# Patient Record
Sex: MALE | Race: WHITE | NOT HISPANIC OR LATINO | Employment: FULL TIME | URBAN - METROPOLITAN AREA
[De-identification: names, ages, dates, MRNs, and addresses within clinical notes are randomized per-mention and may not be internally consistent; named-entity substitution may affect disease eponyms.]

---

## 2017-02-04 ENCOUNTER — COMMUNICATION - HEALTHEAST (OUTPATIENT)
Dept: FAMILY MEDICINE | Facility: CLINIC | Age: 42
End: 2017-02-04

## 2017-02-04 DIAGNOSIS — K21.9 GERD WITHOUT ESOPHAGITIS: ICD-10-CM

## 2017-05-06 ENCOUNTER — COMMUNICATION - HEALTHEAST (OUTPATIENT)
Dept: FAMILY MEDICINE | Facility: CLINIC | Age: 42
End: 2017-05-06

## 2017-05-06 DIAGNOSIS — K21.9 GERD WITHOUT ESOPHAGITIS: ICD-10-CM

## 2017-12-09 ENCOUNTER — COMMUNICATION - HEALTHEAST (OUTPATIENT)
Dept: FAMILY MEDICINE | Facility: CLINIC | Age: 42
End: 2017-12-09

## 2017-12-09 DIAGNOSIS — K21.9 GERD WITHOUT ESOPHAGITIS: ICD-10-CM

## 2017-12-22 ENCOUNTER — OFFICE VISIT - HEALTHEAST (OUTPATIENT)
Dept: FAMILY MEDICINE | Facility: CLINIC | Age: 42
End: 2017-12-22

## 2017-12-22 DIAGNOSIS — M54.2 NECK PAIN: ICD-10-CM

## 2017-12-22 DIAGNOSIS — K21.9 GASTROESOPHAGEAL REFLUX DISEASE WITHOUT ESOPHAGITIS: ICD-10-CM

## 2017-12-22 DIAGNOSIS — F17.200 TOBACCO USE DISORDER: ICD-10-CM

## 2017-12-22 ASSESSMENT — MIFFLIN-ST. JEOR: SCORE: 1833.8

## 2017-12-28 LAB
CHOLEST SERPL-MCNC: 330 MG/DL
FASTING STATUS PATIENT QL REPORTED: YES
HDLC SERPL-MCNC: 39 MG/DL
LDLC SERPL CALC-MCNC: 224 MG/DL
TRIGL SERPL-MCNC: 336 MG/DL

## 2018-02-03 ENCOUNTER — COMMUNICATION - HEALTHEAST (OUTPATIENT)
Dept: FAMILY MEDICINE | Facility: CLINIC | Age: 43
End: 2018-02-03

## 2018-03-08 ENCOUNTER — COMMUNICATION - HEALTHEAST (OUTPATIENT)
Dept: FAMILY MEDICINE | Facility: CLINIC | Age: 43
End: 2018-03-08

## 2018-03-08 DIAGNOSIS — K21.9 GERD WITHOUT ESOPHAGITIS: ICD-10-CM

## 2019-03-10 ENCOUNTER — COMMUNICATION - HEALTHEAST (OUTPATIENT)
Dept: FAMILY MEDICINE | Facility: CLINIC | Age: 44
End: 2019-03-10

## 2019-03-10 DIAGNOSIS — K21.9 GERD WITHOUT ESOPHAGITIS: ICD-10-CM

## 2019-04-11 ENCOUNTER — OFFICE VISIT - HEALTHEAST (OUTPATIENT)
Dept: FAMILY MEDICINE | Facility: CLINIC | Age: 44
End: 2019-04-11

## 2019-04-11 DIAGNOSIS — K21.9 GERD WITHOUT ESOPHAGITIS: ICD-10-CM

## 2019-04-11 DIAGNOSIS — E78.5 HYPERLIPIDEMIA, UNSPECIFIED HYPERLIPIDEMIA TYPE: ICD-10-CM

## 2019-04-11 DIAGNOSIS — R06.2 WHEEZING: ICD-10-CM

## 2019-04-11 DIAGNOSIS — F17.200 TOBACCO USE DISORDER: ICD-10-CM

## 2019-04-11 LAB
ANION GAP SERPL CALCULATED.3IONS-SCNC: 11 MMOL/L (ref 5–18)
BUN SERPL-MCNC: 19 MG/DL (ref 8–22)
CALCIUM SERPL-MCNC: 9.8 MG/DL (ref 8.5–10.5)
CHLORIDE BLD-SCNC: 106 MMOL/L (ref 98–107)
CHOLEST SERPL-MCNC: 251 MG/DL
CO2 SERPL-SCNC: 23 MMOL/L (ref 22–31)
CREAT SERPL-MCNC: 0.86 MG/DL (ref 0.7–1.3)
FASTING STATUS PATIENT QL REPORTED: YES
GFR SERPL CREATININE-BSD FRML MDRD: >60 ML/MIN/1.73M2
GLUCOSE BLD-MCNC: 73 MG/DL (ref 70–125)
HDLC SERPL-MCNC: 37 MG/DL
LDLC SERPL CALC-MCNC: 198 MG/DL
POTASSIUM BLD-SCNC: 3.9 MMOL/L (ref 3.5–5)
SODIUM SERPL-SCNC: 140 MMOL/L (ref 136–145)
TRIGL SERPL-MCNC: 80 MG/DL

## 2019-04-11 ASSESSMENT — MIFFLIN-ST. JEOR: SCORE: 1806.59

## 2019-04-12 ENCOUNTER — COMMUNICATION - HEALTHEAST (OUTPATIENT)
Dept: FAMILY MEDICINE | Facility: CLINIC | Age: 44
End: 2019-04-12

## 2019-04-29 ENCOUNTER — COMMUNICATION - HEALTHEAST (OUTPATIENT)
Dept: FAMILY MEDICINE | Facility: CLINIC | Age: 44
End: 2019-04-29

## 2019-04-29 DIAGNOSIS — E78.5 HYPERLIPIDEMIA, UNSPECIFIED HYPERLIPIDEMIA TYPE: ICD-10-CM

## 2019-11-25 ENCOUNTER — COMMUNICATION - HEALTHEAST (OUTPATIENT)
Dept: FAMILY MEDICINE | Facility: CLINIC | Age: 44
End: 2019-11-25

## 2019-11-25 DIAGNOSIS — E78.5 HYPERLIPIDEMIA, UNSPECIFIED HYPERLIPIDEMIA TYPE: ICD-10-CM

## 2020-03-21 ENCOUNTER — COMMUNICATION - HEALTHEAST (OUTPATIENT)
Dept: FAMILY MEDICINE | Facility: CLINIC | Age: 45
End: 2020-03-21

## 2020-03-21 DIAGNOSIS — K21.9 GERD WITHOUT ESOPHAGITIS: ICD-10-CM

## 2020-05-18 ENCOUNTER — COMMUNICATION - HEALTHEAST (OUTPATIENT)
Dept: FAMILY MEDICINE | Facility: CLINIC | Age: 45
End: 2020-05-18

## 2020-05-18 DIAGNOSIS — E78.5 HYPERLIPIDEMIA, UNSPECIFIED HYPERLIPIDEMIA TYPE: ICD-10-CM

## 2020-06-20 ENCOUNTER — COMMUNICATION - HEALTHEAST (OUTPATIENT)
Dept: FAMILY MEDICINE | Facility: CLINIC | Age: 45
End: 2020-06-20

## 2020-06-20 DIAGNOSIS — K21.9 GERD WITHOUT ESOPHAGITIS: ICD-10-CM

## 2020-08-15 ENCOUNTER — COMMUNICATION - HEALTHEAST (OUTPATIENT)
Dept: FAMILY MEDICINE | Facility: CLINIC | Age: 45
End: 2020-08-15

## 2020-08-15 DIAGNOSIS — E78.5 HYPERLIPIDEMIA, UNSPECIFIED HYPERLIPIDEMIA TYPE: ICD-10-CM

## 2020-09-17 ENCOUNTER — COMMUNICATION - HEALTHEAST (OUTPATIENT)
Dept: FAMILY MEDICINE | Facility: CLINIC | Age: 45
End: 2020-09-17

## 2020-09-17 DIAGNOSIS — K21.9 GERD WITHOUT ESOPHAGITIS: ICD-10-CM

## 2020-11-03 ENCOUNTER — COMMUNICATION - HEALTHEAST (OUTPATIENT)
Dept: FAMILY MEDICINE | Facility: CLINIC | Age: 45
End: 2020-11-03

## 2020-11-06 ENCOUNTER — OFFICE VISIT - HEALTHEAST (OUTPATIENT)
Dept: FAMILY MEDICINE | Facility: CLINIC | Age: 45
End: 2020-11-06

## 2020-11-06 DIAGNOSIS — Z23 NEED FOR TETANUS BOOSTER: ICD-10-CM

## 2020-11-06 DIAGNOSIS — E78.5 HYPERLIPIDEMIA, UNSPECIFIED HYPERLIPIDEMIA TYPE: ICD-10-CM

## 2020-11-06 LAB
ALBUMIN SERPL-MCNC: 4 G/DL (ref 3.5–5)
ALP SERPL-CCNC: 97 U/L (ref 45–120)
ALT SERPL W P-5'-P-CCNC: 24 U/L (ref 0–45)
AST SERPL W P-5'-P-CCNC: 21 U/L (ref 0–40)
BILIRUB DIRECT SERPL-MCNC: 0.1 MG/DL
BILIRUB SERPL-MCNC: 0.4 MG/DL (ref 0–1)
CHOLEST SERPL-MCNC: 180 MG/DL
FASTING STATUS PATIENT QL REPORTED: YES
HDLC SERPL-MCNC: 32 MG/DL
LDLC SERPL CALC-MCNC: 122 MG/DL
PROT SERPL-MCNC: 6.8 G/DL (ref 6–8)
TRIGL SERPL-MCNC: 132 MG/DL

## 2020-11-06 ASSESSMENT — MIFFLIN-ST. JEOR: SCORE: 1943.8

## 2020-11-07 ENCOUNTER — COMMUNICATION - HEALTHEAST (OUTPATIENT)
Dept: FAMILY MEDICINE | Facility: CLINIC | Age: 45
End: 2020-11-07

## 2020-11-07 DIAGNOSIS — K21.9 GERD WITHOUT ESOPHAGITIS: ICD-10-CM

## 2020-11-18 ENCOUNTER — OFFICE VISIT - HEALTHEAST (OUTPATIENT)
Dept: FAMILY MEDICINE | Facility: CLINIC | Age: 45
End: 2020-11-18

## 2020-11-18 DIAGNOSIS — Z02.89 HEALTH EXAMINATION OF DEFINED SUBPOPULATION: ICD-10-CM

## 2020-11-18 DIAGNOSIS — Z87.891 HISTORY OF TOBACCO USE: ICD-10-CM

## 2020-11-18 LAB
ALBUMIN UR-MCNC: NEGATIVE MG/DL
APPEARANCE UR: CLEAR
BILIRUB UR QL STRIP: NEGATIVE
COLOR UR AUTO: YELLOW
GLUCOSE UR STRIP-MCNC: NEGATIVE MG/DL
HGB UR QL STRIP: NEGATIVE
KETONES UR STRIP-MCNC: NEGATIVE MG/DL
LEUKOCYTE ESTERASE UR QL STRIP: NEGATIVE
NITRATE UR QL: NEGATIVE
PH UR STRIP: 7 [PH] (ref 5–8)
SP GR UR STRIP: 1.02 (ref 1–1.03)
UROBILINOGEN UR STRIP-ACNC: NORMAL

## 2020-11-18 ASSESSMENT — MIFFLIN-ST. JEOR: SCORE: 1954.12

## 2020-11-21 ENCOUNTER — COMMUNICATION - HEALTHEAST (OUTPATIENT)
Dept: FAMILY MEDICINE | Facility: CLINIC | Age: 45
End: 2020-11-21

## 2020-11-21 DIAGNOSIS — E78.5 HYPERLIPIDEMIA, UNSPECIFIED HYPERLIPIDEMIA TYPE: ICD-10-CM

## 2020-12-11 ENCOUNTER — COMMUNICATION - HEALTHEAST (OUTPATIENT)
Dept: FAMILY MEDICINE | Facility: CLINIC | Age: 45
End: 2020-12-11

## 2021-03-01 ENCOUNTER — COMMUNICATION - HEALTHEAST (OUTPATIENT)
Dept: FAMILY MEDICINE | Facility: CLINIC | Age: 46
End: 2021-03-01

## 2021-03-01 DIAGNOSIS — K21.9 GERD WITHOUT ESOPHAGITIS: ICD-10-CM

## 2021-05-07 ENCOUNTER — HOSPITAL ENCOUNTER (EMERGENCY)
Facility: CLINIC | Age: 46
Discharge: HOME | End: 2021-05-07
Attending: PHYSICIAN ASSISTANT | Admitting: PHYSICIAN ASSISTANT
Payer: COMMERCIAL

## 2021-05-07 ENCOUNTER — NURSE TRIAGE (OUTPATIENT)
Dept: NURSING | Facility: CLINIC | Age: 46
End: 2021-05-07

## 2021-05-07 VITALS
HEIGHT: 70 IN | RESPIRATION RATE: 18 BRPM | TEMPERATURE: 98 F | WEIGHT: 245 LBS | BODY MASS INDEX: 35.07 KG/M2 | SYSTOLIC BLOOD PRESSURE: 132 MMHG | DIASTOLIC BLOOD PRESSURE: 89 MMHG | HEART RATE: 53 BPM | OXYGEN SATURATION: 98 %

## 2021-05-07 DIAGNOSIS — S05.02XA ABRASION OF LEFT CORNEA, INITIAL ENCOUNTER: ICD-10-CM

## 2021-05-07 DIAGNOSIS — H57.12 ACUTE LEFT EYE PAIN: ICD-10-CM

## 2021-05-07 PROCEDURE — 99284 EMERGENCY DEPT VISIT MOD MDM: CPT | Performed by: PHYSICIAN ASSISTANT

## 2021-05-07 PROCEDURE — 99283 EMERGENCY DEPT VISIT LOW MDM: CPT | Performed by: PHYSICIAN ASSISTANT

## 2021-05-07 PROCEDURE — 250N000009 HC RX 250: Performed by: PHYSICIAN ASSISTANT

## 2021-05-07 RX ORDER — TETRACAINE HYDROCHLORIDE 5 MG/ML
1-2 SOLUTION OPHTHALMIC ONCE
Status: DISCONTINUED | OUTPATIENT
Start: 2021-05-07 | End: 2021-05-07 | Stop reason: HOSPADM

## 2021-05-07 ASSESSMENT — ENCOUNTER SYMPTOMS
EYE PAIN: 1
CARDIOVASCULAR NEGATIVE: 1
CHILLS: 0
HEADACHES: 0
APPETITE CHANGE: 0
ACTIVITY CHANGE: 0
PSYCHIATRIC NEGATIVE: 1
PHOTOPHOBIA: 0
GASTROINTESTINAL NEGATIVE: 1
RESPIRATORY NEGATIVE: 1
EYE ITCHING: 0
EYE DISCHARGE: 1
FEVER: 0
NEUROLOGICAL NEGATIVE: 1
EYE REDNESS: 1

## 2021-05-07 ASSESSMENT — VISUAL ACUITY: OD: 20/40;WITH CORRECTIVE LENSES

## 2021-05-07 ASSESSMENT — MIFFLIN-ST. JEOR: SCORE: 1992.56

## 2021-05-07 NOTE — TELEPHONE ENCOUNTER
Triage call:   Not with patient- no consent on file  Scratched his eye- been up since 2 am and it was very painful    Unable to ask additional questions to patient - girlfriend states that she is a nurse and just is wondering if the ER would be able to help with this injury - writer advised that the ER would be able to help with an eye injury.   Unable to provide additional triage as she is not with the patient. Declines additional questions.     Gabriela Bates RN BSN 5/7/2021 7:16 AM    Reason for Disposition    [1] Caller is not with the adult (patient) AND [2] probable NON-URGENT symptoms    Additional Information    Negative: [1] Caller is not with the adult (patient) AND [2] reporting urgent symptoms    Negative: Lab result questions    Negative: Medication questions    Negative: Caller can't be reached by phone    Negative: Caller has already spoken to PCP or another triager    Negative: RN needs further essential information from caller in order to complete triage    Negative: Requesting regular office appointment    Negative: [1] Caller requesting NON-URGENT health information AND [2] PCP's office is the best resource    Negative: Health Information question, no triage required and triager able to answer question    Negative: General information question, no triage required and triager able to answer question    Negative: Question about upcoming scheduled test, no triage required and triager able to answer question    Protocols used: INFORMATION ONLY CALL - NO TRIAGE[facility]

## 2021-05-07 NOTE — LETTER
May 7, 2021      To Whom It May Concern:      Christian Judd was seen in our Emergency Department today, 05/07/21.  Please excuse patient from work today.  Patient can return to work on Monday as long as symptoms improved and resolved.      Sincerely,        Courtney Thompson PA-C

## 2021-05-07 NOTE — DISCHARGE INSTRUCTIONS
Use eye ointment as directed to left eye.   Tylenol/ibuprofen over the counter as needed for pain.     Follow up with [facility] for recheck in next 2-3 days.     Increase fluids, rest, warm compress to eye, return to the Emergency Room if increased pain, visual changes, eyelid swelling or redness, fevers, or change/worsening symptoms occur.

## 2021-05-07 NOTE — ED PROVIDER NOTES
History     Chief Complaint   Patient presents with     Eye Pain     wike 2 am withleft eye pain. no injury, does not wear contacts, tearing, eye lid swollen     HPI  Christian Judd is a 45 year old male who presents today with left eye pain that woke him up at 2am. Patient denies any known injury or foreign body in the left eye, but states while cooking yesterday he had hot pepper oil get in the left eye with no pain initially after it and no pain in the evening until he woke up at 2 AM with left eye pain, redness and tearing.  Patient denies any contact lens use, recent illness, fevers, eyelid swelling or redness, deep eye pain, loss of vision or double vision.     Allergies:  Allergies   Allergen Reactions     Nkda [No Known Drug Allergies]        Problem List:    Patient Active Problem List    Diagnosis Date Noted     CARDIOVASCULAR SCREENING; LDL GOAL LESS THAN 160 10/31/2010     Priority: Medium        Past Medical History:    No past medical history on file.    Past Surgical History:    No past surgical history on file.    Family History:    No family history on file.    Social History:  Marital Status:  Single [1]  Social History     Tobacco Use     Smoking status: Current Every Day Smoker   Substance Use Topics     Alcohol use: No     Drug use: No        Medications:    gentamicin (GARAMYCIN) 0.3 % ophthalmic ointment  NO ACTIVE MEDICATIONS          Review of Systems   Constitutional: Negative for activity change, appetite change, chills and fever.   HENT: Negative.    Eyes: Positive for pain, discharge, redness and visual disturbance (blurry vision due to ). Negative for photophobia and itching.   Respiratory: Negative.    Cardiovascular: Negative.    Gastrointestinal: Negative.    Skin: Negative.    Neurological: Negative.  Negative for headaches.   Psychiatric/Behavioral: Negative.    All other systems reviewed and are negative.      Physical Exam   BP: 132/89  Pulse: 53  Temp: 98  F (36.7  C)  Resp:  "18  Height: 177.8 cm (5' 10\")  Weight: 111.1 kg (245 lb)  SpO2: 98 %      Physical Exam  Vitals signs and nursing note reviewed.   Constitutional:       General: He is in acute distress.      Appearance: Normal appearance. He is normal weight. He is not ill-appearing, toxic-appearing or diaphoretic.   HENT:      Head: Normocephalic and atraumatic.   Eyes:      General: Lids are normal. Lids are everted, no foreign bodies appreciated. Gaze aligned appropriately. No scleral icterus.        Right eye: No foreign body, discharge or hordeolum.         Left eye: Discharge present.No foreign body or hordeolum.      Extraocular Movements: Extraocular movements intact.      Right eye: Normal extraocular motion and no nystagmus.      Left eye: Normal extraocular motion and no nystagmus.      Conjunctiva/sclera:      Right eye: Right conjunctiva is not injected. No chemosis, exudate or hemorrhage.     Left eye: Left conjunctiva is injected. No chemosis, exudate or hemorrhage.     Pupils: Pupils are equal, round, and reactive to light.      Right eye: Pupil is round, reactive and not sluggish.      Left eye: Pupil is round, reactive and not sluggish. Corneal abrasion and fluorescein uptake present. Ollie exam negative.     Slit lamp exam:     Left eye: Photophobia present. No corneal flare, corneal ulcer, foreign body, hyphema, hypopyon, anterior chamber bulge or anterior chamber flares.        Comments: Patient unable to do visual acuity due to watery left eye.   Right eye 20/40  Left eye 20/150    Occular pressure  Right eye 13  Left eye 15   Neurological:      Mental Status: He is alert.         ED Course        Procedures              Critical Care time:  none                  No results found for this or any previous visit (from the past 24 hour(s)).    Medications - No data to display    Assessments & Plan (with Medical Decision Making)     I have reviewed the nursing notes.    I have reviewed the findings, diagnosis, " plan and need for follow up with the patient.  Christian Judd is a 45 year old male who presents today with left eye pain that woke him up at 2am. Patient denies any known injury or foreign body in the left eye, but states while cooking yesterday he had hot pepper oil get in the left eye with no pain initially after it and no pain in the evening until he woke up at 2 AM with left eye pain, redness and tearing.  Patient denies any contact lens use, recent illness, fevers, eyelid swelling or redness, deep eye pain, loss of vision or double vision.     See exam findings above.  Exam findings consistent with corneal abrasion possibly related to the hot pepper oil or possibly due to rubbing his eye middle the night.  No concerns at this time for dendritic lesions or corneal ulceration.  Patient sent home with gentamicin eye ointment and informed to [facility] with [facility] in the next couple of days for further evaluation and recheck.  Patient to return sooner if symptoms worsen or change these were discussed and given on discharge paperwork.  Patient discharged in stable condition with no concerns for dendritic lesions, pre or periorbital cellulitis.    Discharge Medication List as of 5/7/2021  9:22 AM      START taking these medications    Details   gentamicin (GARAMYCIN) 0.3 % ophthalmic ointment Place 0.5 inches Into the left eye 3 times daily for 7 daysDisp-3.5 g, [id number]             Final diagnoses:   Acute left eye pain   Abrasion of left cornea, initial encounter       5/7/2021   [facility] EMERGENCY DEPT     Courtney Thompson PA-C  05/07/21 [id number]

## 2021-05-27 NOTE — TELEPHONE ENCOUNTER
Patient Returning Call  Reason for call:  Return clinic call  Information relayed to patient:   Message from Rg Monique MD sent at 4/12/2019  6:28 AM CDT -----  Please call with the message below and let me know if he will start a cholesterol lowering medication or will work on diet and exercise and recheck. Results verified to mail as well.     Christian,     Here are your test results.  Your kidney function tests are normal and your blood sugar test is also normal.     Your cholesterol numbers remain significantly elevated.  This is likely a combination of genetics as well as your diet.  As we discussed, you can make improvements in your diet and exercise.  However, your numbers are significantly elevated and in calculating your cardiovascular risk you do meet the criteria for a cholesterol-lowering medication.  I recommend that you consider a medication such as atorvastatin/Lipitor.  Please let me know if you would like to consider this my concerns to your pharmacy.  I do recommend rechecking your cholesterol numbers in 3 months.  Patient has additional questions:  Yes  If YES, what are your questions/concerns:  n/a  Okay to leave a detailed message?: No call back needed           Patient will think about starting a statin drug and let provider know.

## 2021-05-27 NOTE — TELEPHONE ENCOUNTER
----- Message from Rg Monique MD sent at 4/12/2019  6:28 AM CDT -----  Please call with the message below and let me know if he will start a cholesterol lowering medication or will work on diet and exercise and recheck. Results verified to mail as well.    Christian,    Here are your test results.  Your kidney function tests are normal and your blood sugar test is also normal.    Your cholesterol numbers remain significantly elevated.  This is likely a combination of genetics as well as your diet.  As we discussed, you can make improvements in your diet and exercise.  However, your numbers are significantly elevated and in calculating your cardiovascular risk you do meet the criteria for a cholesterol-lowering medication.  I recommend that you consider a medication such as atorvastatin/Lipitor.  Please let me know if you would like to consider this my concerns to your pharmacy.  I do recommend rechecking your cholesterol numbers in 3 months.    Please call with questions or contact us using [employer].    Sincerely,        Electronically signed by Rg Monique MD

## 2021-05-27 NOTE — PATIENT INSTRUCTIONS - HE
We will check your laboratory testing as discussed      Your cholesterol numbers are high.  I recommend that you work to improve your diet:    Consume a diet that encourages vegetables, fruits, and whole grains  Dairy products should be low fat  Eat more poultry, fish, beans and nuts as a primary protein source  Limit sweets, sugar-sweetened beverages, and red meats  Use healthy oils like olive oil or canola oil for cooking  Limit high fat foods  Drink more water  Limit salt in your diet  Get plenty of aerobic physical activity    I recommend rechecking in three months.    Please let me know if you have questions or need additional resources.

## 2021-05-27 NOTE — PROGRESS NOTES
Assessment/ Plan     1. GERD without esophagitis    Symptoms are currently stable he is required a higher dose of omeprazole discussed possible weaning  Patient preference is to stay on the current dose  Discussed that he has breakthrough symptoms would favor an upper endoscopy  Reviewed potential side effects  We will check a basic metabolic panel  - omeprazole (PRILOSEC) 40 MG capsule; TAKE 1 CAPSULE BY MOUTH EVERYDAY  Dispense: 90 capsule; Refill: 3    2. Wheezing    Refilled his albuterol inhaler  Discussed that he could be considered for pulmonary function testing  Strongly recommend tobacco cessation  - albuterol (PROAIR HFA;PROVENTIL HFA;VENTOLIN HFA) 90 mcg/actuation inhaler; Inhale 2 puffs every 4 (four) hours as needed for wheezing. 1-2 puffs every 4-6 hours as needed.  Dispense: 1 Inhaler; Refill: 5    3. Nicotine Dependence    Counseled the patient regarding tobacco cessation options  Patient preference is not to wean tobacco at this time    4. Hyperlipidemia, unspecified hyperlipidemia type    Given his elevated cholesterol readings there is likely a history of familial hypercholesterolemia  We will check his cholesterol calculate the 10-year cardiovascular risk  Reviewed dietary and lifestyle modifications  Consider further evaluation including a CT coronary calcium test in the future  - Lipid Cascade  - Basic Metabolic Panel     Patient verbalized understanding and agrees to the above plan          Subjective:       Christian Judd is a 43 y.o. male who presents to the clinic for medication check.  He comes to clinic infrequently he was last seen in 2017.  His medical history is notable for gastroesophageal reflux disease, nicotine dependence, and hyperlipidemia.  He continues to take omeprazole 40 mg daily.  He notes that when he takes the medication his symptoms are well controlled.  He has not been able to wean off of the medication.  He has known history of Valles's esophagus.  He is aware of  "dietary lifestyle modifications that can be helpful.    Additionally, he has a history of ongoing nicotine dependence.  He started smoking at the age of 14 and smokes approximately 1 pack/day.  He has no interest in quitting tobacco at this time.    Finally, he has a history of hyperlipidemia.  His total cholesterol was 330 with an LDL of 224.  His triglycerides were 336 with and HDL was 39.  He follows up today for a cholesterol check.  He admits that his diet could be better.      Review of systems is notable for occasional wheezing which she attributes to tobacco cessation.  Has no known history of asthma or chronic obstructive pulmonary disease.    Overall, he generally feels well.  He denies headache, dizziness, chest pain, palpitations, or other concerns.      The following portions of the patient's history were reviewed and updated as appropriate: allergies, current medications, past family history, past medical history, past social history, past surgical history and problem list. Medications have been reconciled    Review of Systems   A 12 point comprehensive review of systems was negative except as noted.      Current Outpatient Medications   Medication Sig Dispense Refill     albuterol (PROAIR HFA;PROVENTIL HFA;VENTOLIN HFA) 90 mcg/actuation inhaler Inhale 2 puffs every 4 (four) hours as needed for wheezing. 1-2 puffs every 4-6 hours as needed. 1 Inhaler 5     omeprazole (PRILOSEC) 40 MG capsule TAKE 1 CAPSULE BY MOUTH EVERYDAY 90 capsule 3     cyclobenzaprine (FLEXERIL) 10 MG tablet Take 1 tablet (10 mg total) by mouth 3 (three) times a day as needed for muscle spasms. 30 tablet 0     No current facility-administered medications for this visit.        Objective:      /72   Pulse 76   Temp 98  F (36.7  C)   Resp 16   Ht 5' 10\" (1.778 m)   Wt 204 lb (92.5 kg)   BMI 29.27 kg/m        General appearance: alert, appears stated age and cooperative  Head: Normocephalic, without obvious abnormality, " atraumatic  Eyes: conjunctivae/corneas clear. PERRL, EOM's intact.   Nose: Nares normal. Septum midline. Mucosa normal  Neck: no adenopathy, supple, symmetrical, trachea midline a  Lungs: clear to auscultation bilaterally  Heart: regular rate and rhythm, S1, S2 normal, no murmur, click, rub or gallop  Extremities: extremities normal, atraumatic, no cyanosis or edema  Skin: Skin color, texture, turgor normal  Lymph nodes: Cervical nodes normal.  Neurologic: Alert and oriented X 3         Recent Results (from the past 168 hour(s))   Lipid Cascade   Result Value Ref Range    Cholesterol 251 (H) <=199 mg/dL    Triglycerides 80 <=149 mg/dL    HDL Cholesterol 37 (L) >=40 mg/dL    LDL Calculated 198 (H) <=129 mg/dL    Patient Fasting > 8hrs? Yes    Basic Metabolic Panel   Result Value Ref Range    Sodium 140 136 - 145 mmol/L    Potassium 3.9 3.5 - 5.0 mmol/L    Chloride 106 98 - 107 mmol/L    CO2 23 22 - 31 mmol/L    Anion Gap, Calculation 11 5 - 18 mmol/L    Glucose 73 70 - 125 mg/dL    Calcium 9.8 8.5 - 10.5 mg/dL    BUN 19 8 - 22 mg/dL    Creatinine 0.86 0.70 - 1.30 mg/dL    GFR MDRD Af Amer >60 >60 mL/min/1.73m2    GFR MDRD Non Af Amer >60 >60 mL/min/1.73m2          This note has been dictated using voice recognition software. Any grammatical or context distortions are unintentional and inherent to the software

## 2021-05-27 NOTE — TELEPHONE ENCOUNTER
Left message to call back for: lab results  Information to relay to patient:  MD message below and document call was returned.

## 2021-05-28 NOTE — TELEPHONE ENCOUNTER
I sent a prescription for atorvastatin to his pharmacy.  He can monitor for side effects including muscle aches and pains.  I do recommend that he recheck his cholesterol numbers in 3 months.  I entered laboratory orders so he can make a fasting lab appointment at that time and we will check liver tests at that time as well.

## 2021-05-28 NOTE — TELEPHONE ENCOUNTER
Left detailed message for pt relaying MD message below.  Please assist with scheduling lab only in 3 months (fasting) upon return call.

## 2021-05-28 NOTE — TELEPHONE ENCOUNTER
Medication Request  Medication name: cholesterol lower medication  Pharmacy Name and Location: [facility]  Reason for request: Provider recommend and patient would like to start a medication now  When did you use medication last?:  n/a  Patient offered appointment:  n/a  Okay to leave a detailed message: yes

## 2021-05-31 ENCOUNTER — RECORDS - HEALTHEAST (OUTPATIENT)
Dept: ADMINISTRATIVE | Facility: CLINIC | Age: 46
End: 2021-05-31

## 2021-05-31 VITALS — HEIGHT: 70 IN | WEIGHT: 210 LBS | BODY MASS INDEX: 30.06 KG/M2

## 2021-06-02 ENCOUNTER — RECORDS - HEALTHEAST (OUTPATIENT)
Dept: ADMINISTRATIVE | Facility: CLINIC | Age: 46
End: 2021-06-02

## 2021-06-02 VITALS — WEIGHT: 204 LBS | BODY MASS INDEX: 29.2 KG/M2 | HEIGHT: 70 IN

## 2021-06-03 NOTE — TELEPHONE ENCOUNTER
Refill Approved    Rx renewed per Medication Renewal Policy. Medication was last renewed on 5/1/19.    Gabriela Bates, [facility] Connection Triage/Med Refill 11/25/2019     Requested Prescriptions   Pending Prescriptions Disp Refills     atorvastatin (LIPITOR) 20 MG tablet [Pharmacy Med Name: ATORVASTATIN 20MG TABLETS] 90 tablet 0     Sig: TAKE 1 TABLET(20 MG) BY MOUTH DAILY       Statins Refill Protocol (Hmg CoA Reductase Inhibitors) Passed - 11/25/2019  3:26 AM        Passed - PCP or prescribing provider visit in past 12 months      Last office visit with prescriber/PCP: 4/11/2019 Rg Monique MD OR same dept: 4/11/2019 Rg Monique MD OR same specialty: 4/11/2019 Rg Monique MD  Last physical: Visit date not found Last MTM visit: Visit date not found   Next visit within 3 mo: Visit date not found  Next physical within 3 mo: Visit date not found  Prescriber OR PCP: Rg Monique MD  Last diagnosis associated with med order: 1. Hyperlipidemia, unspecified hyperlipidemia type  - atorvastatin (LIPITOR) 20 MG tablet [Pharmacy Med Name: ATORVASTATIN 20MG TABLETS]; TAKE 1 TABLET(20 MG) BY MOUTH DAILY  Dispense: 90 tablet; Refill: 0    If protocol passes may refill for 12 months if within 3 months of last provider visit (or a total of 15 months).

## 2021-06-04 VITALS
TEMPERATURE: 97.8 F | HEIGHT: 70 IN | DIASTOLIC BLOOD PRESSURE: 74 MMHG | WEIGHT: 237.4 LBS | HEART RATE: 64 BPM | BODY MASS INDEX: 33.99 KG/M2 | SYSTOLIC BLOOD PRESSURE: 116 MMHG

## 2021-06-04 VITALS
DIASTOLIC BLOOD PRESSURE: 71 MMHG | RESPIRATION RATE: 12 BRPM | HEART RATE: 78 BPM | TEMPERATURE: 97.6 F | BODY MASS INDEX: 33.79 KG/M2 | SYSTOLIC BLOOD PRESSURE: 116 MMHG | HEIGHT: 70 IN | OXYGEN SATURATION: 96 % | WEIGHT: 236 LBS

## 2021-06-07 NOTE — TELEPHONE ENCOUNTER
Refill Approved    Rx renewed per Medication Renewal Policy. Medication was last renewed on 4/11/19.    Aletha Kramer, [facility] Connection Triage/Med Refill 3/23/2020     Requested Prescriptions   Pending Prescriptions Disp Refills     omeprazole (PRILOSEC) 40 MG capsule [Pharmacy Med Name: OMEPRAZOLE 40MG CAPSULES] 90 capsule 3     Sig: TAKE 1 CAPSULE BY MOUTH EVERY DAY       GI Medications Refill Protocol Passed - 3/21/2020  3:25 AM        Passed - PCP or prescribing provider visit in last 12 or next 3 months.     Last office visit with prescriber/PCP: 4/11/2019 Rg Monique MD OR same dept: 4/11/2019 Rg Monique MD OR same specialty: 4/11/2019 Rg Monique MD  Last physical: Visit date not found Last MTM visit: Visit date not found   Next visit within 3 mo: Visit date not found  Next physical within 3 mo: Visit date not found  Prescriber OR PCP: Rg Monique MD  Last diagnosis associated with med order: 1. GERD without esophagitis  - omeprazole (PRILOSEC) 40 MG capsule [Pharmacy Med Name: OMEPRAZOLE 40MG CAPSULES]; TAKE 1 CAPSULE BY MOUTH EVERY DAY  Dispense: 90 capsule; Refill: 3    If protocol passes may refill for 12 months if within 3 months of last provider visit (or a total of 15 months).

## 2021-06-08 NOTE — TELEPHONE ENCOUNTER
RN cannot approve Refill Request    RN can NOT refill this medication PCP messaged that patient is overdue for Office Visit. Last office visit: 4/11/2019 Rg Monique MD Last Physical: Visit date not found Last MTM visit: Visit date not found Last visit same specialty: 4/11/2019 Rg Monique MD.  Next visit within 3 mo: Visit date not found  Next physical within 3 mo: Visit date not found      Karime Han, [facility] Connection Triage/Med Refill 5/20/2020    Requested Prescriptions   Pending Prescriptions Disp Refills     atorvastatin (LIPITOR) 20 MG tablet [Pharmacy Med Name: ATORVASTATIN 20MG TABLETS] 90 tablet 1     Sig: TAKE 1 TABLET(20 MG) BY MOUTH DAILY       Statins Refill Protocol (Hmg CoA Reductase Inhibitors) Failed - 5/18/2020  3:26 AM        Failed - PCP or prescribing provider visit in past 12 months      Last office visit with prescriber/PCP: 4/11/2019 Rg Monique MD OR same dept: Visit date not found OR same specialty: 4/11/2019 Rg Monique MD  Last physical: Visit date not found Last MTM visit: Visit date not found   Next visit within 3 mo: Visit date not found  Next physical within 3 mo: Visit date not found  Prescriber OR PCP: Rg Monique MD  Last diagnosis associated with med order: 1. Hyperlipidemia, unspecified hyperlipidemia type  - atorvastatin (LIPITOR) 20 MG tablet [Pharmacy Med Name: ATORVASTATIN 20MG TABLETS]; TAKE 1 TABLET(20 MG) BY MOUTH DAILY  Dispense: 90 tablet; Refill: 1    If protocol passes may refill for 12 months if within 3 months of last provider visit (or a total of 15 months).

## 2021-06-09 NOTE — TELEPHONE ENCOUNTER
RN cannot approve Refill Request    RN can NOT refill this medication PCP messaged that patient is overdue for Office Visit. Last office visit: 4/11/2019 Rg Monique MD Last Physical: Visit date not found Last MTM visit: Visit date not found Last visit same specialty: 4/11/2019 Rg Monique MD.  Next visit within 3 mo: Visit date not found  Next physical within 3 mo: Visit date not found      Veronika Basilio, [facility] Connection Triage/Med Refill 6/20/2020    Requested Prescriptions   Pending Prescriptions Disp Refills     omeprazole (PRILOSEC) 40 MG capsule [Pharmacy Med Name: OMEPRAZOLE 40MG CAPSULES] 90 capsule 0     Sig: TAKE 1 CAPSULE BY MOUTH EVERY DAY       GI Medications Refill Protocol Failed - 6/20/2020  3:26 AM        Failed - PCP or prescribing provider visit in last 12 or next 3 months.     Last office visit with prescriber/PCP: 4/11/2019 Rg Monique MD OR same dept: Visit date not found OR same specialty: 4/11/2019 Rg Monique MD  Last physical: Visit date not found Last MTM visit: Visit date not found   Next visit within 3 mo: Visit date not found  Next physical within 3 mo: Visit date not found  Prescriber OR PCP: Rg Monique MD  Last diagnosis associated with med order: 1. GERD without esophagitis  - omeprazole (PRILOSEC) 40 MG capsule [Pharmacy Med Name: OMEPRAZOLE 40MG CAPSULES]; TAKE 1 CAPSULE BY MOUTH EVERY DAY  Dispense: 90 capsule; Refill: 0    If protocol passes may refill for 12 months if within 3 months of last provider visit (or a total of 15 months).

## 2021-06-10 NOTE — TELEPHONE ENCOUNTER
RN cannot approve Refill Request    RN can NOT refill this medication PCP messaged that patient is overdue for Office Visit. Last office visit: 4/11/2019 Rg Monique MD Last Physical: Visit date not found Last MTM visit: Visit date not found Last visit same specialty: 4/11/2019 Rg Monique MD.  Next visit within 3 mo: Visit date not found  Next physical within 3 mo: Visit date not found      Veronika Basilio, [facility] Triage/Med Refill 8/17/2020    Requested Prescriptions   Pending Prescriptions Disp Refills     atorvastatin (LIPITOR) 20 MG tablet [Pharmacy Med Name: ATORVASTATIN 20MG TABLETS] 90 tablet 0     Sig: TAKE 1 TABLET(20 MG) BY MOUTH DAILY       Statins Refill Protocol (Hmg CoA Reductase Inhibitors) Failed - 8/15/2020  3:29 AM        Failed - PCP or prescribing provider visit in past 12 months      Last office visit with prescriber/PCP: 4/11/2019 Rg Monique MD OR same dept: Visit date not found OR same specialty: 4/11/2019 Rg Monique MD  Last physical: Visit date not found Last MTM visit: Visit date not found   Next visit within 3 mo: Visit date not found  Next physical within 3 mo: Visit date not found  Prescriber OR PCP: Rg Monique MD  Last diagnosis associated with med order: 1. Hyperlipidemia, unspecified hyperlipidemia type  - atorvastatin (LIPITOR) 20 MG tablet [Pharmacy Med Name: ATORVASTATIN 20MG TABLETS]; TAKE 1 TABLET(20 MG) BY MOUTH DAILY  Dispense: 90 tablet; Refill: 0    If protocol passes may refill for 12 months if within 3 months of last provider visit (or a total of 15 months).

## 2021-06-10 NOTE — TELEPHONE ENCOUNTER
Thank you for the update.  Please let him know that there is a potential risks to his medication and it can cause liver abnormalities which ideally would be checked.  I entered future orders so that this can be done in the future.  I did send a refill for him though based on his concerns.

## 2021-06-10 NOTE — TELEPHONE ENCOUNTER
Spoke to pt, he states he has been taking it consistently. He states he is unable to come in for a lab appt as he has not been working due to COVID and the cost is too high for him to come in for an appt. He says once he returns to work, he would be more than happy to come in. Please advise on refill.

## 2021-06-10 NOTE — TELEPHONE ENCOUNTER
Please call.  There is a refill request for atorvastatin.  However, he has not had his cholesterol or liver test recheck since starting this medication.  Please ask if he is taking this consistently.  He does need to do laboratory testing.

## 2021-06-11 NOTE — TELEPHONE ENCOUNTER
RN cannot approve Refill Request    RN can NOT refill this medication Protocol failed and NO refill given. Last office visit: 4/11/2019 Rg Monique MD Last Physical: Visit date not found Last MTM visit: Visit date not found Last visit same specialty: 4/11/2019 Rg Monique MD.  Next visit within 3 mo: Visit date not found  Next physical within 3 mo: Visit date not found      Aletha Kramer, [facility] Triage/Med Refill 9/18/2020    Requested Prescriptions   Pending Prescriptions Disp Refills     omeprazole (PRILOSEC) 40 MG capsule [Pharmacy Med Name: OMEPRAZOLE 40MG CAPSULES] 90 capsule 0     Sig: TAKE 1 CAPSULE BY MOUTH EVERY DAY       GI Medications Refill Protocol Failed - 9/17/2020  7:13 PM        Failed - PCP or prescribing provider visit in last 12 or next 3 months.     Last office visit with prescriber/PCP: 4/11/2019 Rg Monique MD OR same dept: Visit date not found OR same specialty: 4/11/2019 Rg Monique MD  Last physical: Visit date not found Last MTM visit: Visit date not found   Next visit within 3 mo: Visit date not found  Next physical within 3 mo: Visit date not found  Prescriber OR PCP: Rg Monique MD  Last diagnosis associated with med order: 1. GERD without esophagitis  - omeprazole (PRILOSEC) 40 MG capsule [Pharmacy Med Name: OMEPRAZOLE 40MG CAPSULES]; TAKE 1 CAPSULE BY MOUTH EVERY DAY  Dispense: 90 capsule; Refill: 0    If protocol passes may refill for 12 months if within 3 months of last provider visit (or a total of 15 months).

## 2021-06-12 NOTE — TELEPHONE ENCOUNTER
----- Message from Prashanth Puente MD sent at 11/10/2020  9:18 AM CST -----    ----- Message -----  From: Prashanth Puente MD  Sent: 11/6/2020   5:38 PM CST  To: Prashanth Puente Care Team Pool    I called the patient but no answer. Please call him and give him my results below.    Christian,     Your results show thatyour cholesterol has lowered appropriately with your statin. You should continue on the same dose you have been taking. Liver function tests are normal.    Dr. Prashanth Puente

## 2021-06-12 NOTE — TELEPHONE ENCOUNTER
Left message to call back for: Results  Information to relay to patient:  LMTCB, Please relay message below from Dr. Puente.

## 2021-06-12 NOTE — PROGRESS NOTES
"       HPI:   Christian uJdd is a 45 y.o.  male who presents for:    Chief Complaint   Patient presents with     Medication Management     Fasting labs, chol and check for diabetes.      Patient with LDL above 190 in April 2019.  Patient was to start a statin follow-up in 3 months for liver function tests and repeat fasting  Lipids.    Today:   Patient feels well. Has no other concerns.  Lungs with some rales today and asked about fevers, cough, shortness of breath, flulike symptoms and he denies all these.         PMHX:     Patient Active Problem List   Diagnosis     Nicotine Dependence     Esophageal Reflux     Hyperlipidemia       Current Outpatient Medications   Medication Sig Dispense Refill     albuterol (PROAIR HFA;PROVENTIL HFA;VENTOLIN HFA) 90 mcg/actuation inhaler Inhale 2 puffs every 4 (four) hours as needed for wheezing. 1-2 puffs every 4-6 hours as needed. 1 Inhaler 5     atorvastatin (LIPITOR) 20 MG tablet TAKE 1 TABLET(20 MG) BY MOUTH DAILY 90 tablet 0     omeprazole (PRILOSEC) 40 MG capsule TAKE 1 CAPSULE BY MOUTH EVERY DAY 90 capsule 0     No current facility-administered medications for this visit.        Social History     Tobacco Use     Smoking status: Former Smoker     Smokeless tobacco: Never Used   Substance Use Topics     Alcohol use: Not on file     Drug use: Not on file       Social History     Social History Narrative     Not on file       No Known Allergies           Review of Systems:    Complete ROS is negative except as noted in the HPI         Physical Exam:   /71   Pulse 78   Temp 97.6  F (36.4  C) (Oral)   Resp 12   Ht 5' 9.5\" (1.765 m)   Wt (!) 236 lb (107 kg)   SpO2 96%   BMI 34.35 kg/m      General appearance: Alert, cooperative, no distress, appears stated age  Head: Normocephalic, atraumatic, without obvious abnormality  Eyes: Pupils equal round, reactive.  Conjunctiva clear.  Nose: Nares normal, no drainage.  Throat: Lips, mucosa, tongue normal mucosa pink and " moist  Neck: Supple, symmetric, trachea midline, no adenopathy.  No thyroid enlargement, tenderness or nodules.    Back: Symmetric, no CVA tenderness.  Lungs: Mild rales heard on expiration but no wheezing or rhonchi.  Respirations unlabored  Heart: Regular rate and rhythm, normal S1 and S2, no murmur, rub or gallop.  Abdomen: Soft, nontender, nondistended.  Bowel sounds active in all 4 quadrants.  No masses or organomegaly.  Extremities: Extremities normal, atraumatic.  No cyanosis or edema.  Skin: Skin color, texture, turgor normal no rashes or lesions on limited skin exam  Neurologic: CN II through XII intact, normal strength.    Assessment and Plan   1. Hyperlipidemia, unspecified hyperlipidemia type  LDL above 190 on last check in April 2020.  Started on atorvastatin 20 mg daily which patient is taking faithfully.  Will recheck today and look for 30% improvement in LDL and is well check hepatic profile for side effects of statin use and to screen for Schwartz.  - Lipid [place] FASTING  - Hepatic Profile    2. Need for tetanus booster  Due, GIven  - Tdap vaccine,  6yo or older,  IM    Follow up: Per lab results  Options for treatment and follow-up care were reviewed with the patient and/or guardian. Christian Judd and/or guardian engaged in the decision making process and verbalized understanding of the options discussed and agreed with the final plan.    Dr. Prashanth Mock MD

## 2021-06-12 NOTE — TELEPHONE ENCOUNTER
Refill Approved    Rx renewed per Medication Renewal Policy. Medication was last renewed on 09/23/2020.  Last office visit was 11/06/2020 with PCP.    Kaitlyn Matthews, [facility] Triage/Med Refill 11/9/2020     Requested Prescriptions   Pending Prescriptions Disp Refills     omeprazole (PRILOSEC) 40 MG capsule [Pharmacy Med Name: OMEPRAZOLE 40MG CAPSULES] 90 capsule 0     Sig: TAKE 1 CAPSULE BY MOUTH EVERY DAY       GI Medications Refill Protocol Passed - 11/8/2020  6:17 PM        Passed - PCP or prescribing provider visit in last 12 or next 3 months.     Last office visit with prescriber/PCP: 11/6/2020 Prashanth Puente MD OR same dept: Visit date not found OR same specialty: 11/6/2020 Prashanth Puente MD  Last physical: Visit date not found Last MTM visit: Visit date not found   Next visit within 3 mo: Visit date not found  Next physical within 3 mo: Visit date not found  Prescriber OR PCP: Prashanth Puente MD  Last diagnosis associated with med order: 1. GERD without esophagitis  - omeprazole (PRILOSEC) 40 MG capsule [Pharmacy Med Name: OMEPRAZOLE 40MG CAPSULES]; TAKE 1 CAPSULE BY MOUTH EVERY DAY  Dispense: 90 capsule; Refill: 0    If protocol passes may refill for 12 months if within 3 months of last provider visit (or a total of 15 months).

## 2021-06-12 NOTE — PROGRESS NOTES
I called the patient but no answer. Please call him and give him my results below.    Christian,    Your results show thatyour cholesterol has lowered appropriately with your statin. You should continue on the same dose you have been taking. Liver function tests are normal.    Dr. Prashanth Mock

## 2021-06-13 NOTE — TELEPHONE ENCOUNTER
Left message for Christian if he was able to come into the clinic to have the information on the form filled out that was missed. Mulu Cesar LPN

## 2021-06-13 NOTE — PROGRESS NOTES
" Medical Examination      Assessment:      Healthy male exam.   Meets standards in 47 .41;  qualifies for 2 year certificate.      Plan:        Medical examiners certificate completed and printed.  Return as needed.         Subjective:      Chirstian Judd is a 45 y.o. male who presents today for a  fitness determination physical exam. The patient reports no problems.  The following portions of the patient's history were reviewed and updated as appropriate: allergies, current medications, past family history, past medical history, past social history, past surgical history and problem list.  Patient Active Problem List   Diagnosis     History of tobacco use     Esophageal Reflux     Hyperlipidemia     Review of Systems  A 12 point comprehensive review of systems was negative except as noted.     Objective:        Vision: please see scanned items .. Pass     Applicant can recognize and distinguish among traffic control signals and devices showing standard red, green, and grazyna colors.  Monocular Vision?: NO    Hearing:   passed the whisper test  At 5 Feet     /74   Pulse 64   Temp 97.8  F (36.6  C) (Oral)   Ht 5' 9.75\" (1.772 m)   Wt (!) 237 lb 6.4 oz (107.7 kg)   BMI 34.31 kg/m        PHYSICAL EXAM  General: Alert, no acute distress.   EYES normocephalic conjunctivae are patricia,   EAR Normal pearly TMs bilaterally without erythema, pus or fluid  Nose is clear.  Oropharynx is moist and clear,   Neck: supple without adenopathy or thyromegaly.  Lungs: Good aeration bilaterally.Clear to auscultation without wheezes, rales or rhonci.    Heart: regular rate and rhythm, normal S1 and S2, no murmurs  Skin: clear without rash or lesions  Neuro: normal muscle tone in all 4 extremities, deep tendon reflexes 2+ symmetrically at the patella    Labs:    Lab Results   Component Value Date    SPECGRAV 1.020 11/18/2020    BILIRUBINUR Negative 11/18/2020    GLUCOSEU Negative 11/18/2020 "

## 2021-06-13 NOTE — TELEPHONE ENCOUNTER
Question following Office Visit  When did you see your provider: 11/18/2020  What is your question: Patient reports the DOT certificate was not filled out correctly. It was missing the following:    Date the certificate was signed    The national registation number was not on this.  Please return call with plan.  Okay to leave a detailed message: Yes

## 2021-06-13 NOTE — TELEPHONE ENCOUNTER
We did fill up all the information yesterday but if we have missed something we can resign please asked patient to bring the failed form back and not be able to sign it.  If he brings form back by the time I leave the clinic by national registration number is [id number]... And that number is I believe I already put it on that  And date signed will be yesterday 12/10/2020  My license # [id number] please fill the info as I already signed it yesterday  Faina Muñoz MD 12/11/2020 12:56 PM

## 2021-06-13 NOTE — TELEPHONE ENCOUNTER
Patient Returning Call  Reason for call:  Patient called back.  Information relayed to patient:  Informed of Dr Puente's message listed below.  Patient states understanding.  Patient has additional questions:  No  If YES, what are your questions/concerns:    Okay to leave a detailed message?: No call back needed

## 2021-06-13 NOTE — TELEPHONE ENCOUNTER
Refill Approved    Rx renewed per Medication Renewal Policy. Medication was last renewed on 8/21/20, last OV 11/18/20.    Veronika Basilio, [facility] Triage/Med Refill 11/22/2020     Requested Prescriptions   Pending Prescriptions Disp Refills     atorvastatin (LIPITOR) 20 MG tablet [Pharmacy Med Name: ATORVASTATIN 20MG TABLETS] 90 tablet 0     Sig: TAKE 1 TABLET(20 MG) BY MOUTH DAILY       Statins Refill Protocol (Hmg CoA Reductase Inhibitors) Passed - 11/21/2020  3:30 AM        Passed - PCP or prescribing provider visit in past 12 months      Last office visit with prescriber/PCP: 4/11/2019 Rg Monique MD OR same dept: Visit date not found OR same specialty: 11/18/2020 Faina Muñoz MD  Last physical: Visit date not found Last MTM visit: Visit date not found   Next visit within 3 mo: Visit date not found  Next physical within 3 mo: Visit date not found  Prescriber OR PCP: Rg Monique MD  Last diagnosis associated with med order: 1. Hyperlipidemia, unspecified hyperlipidemia type  - atorvastatin (LIPITOR) 20 MG tablet [Pharmacy Med Name: ATORVASTATIN 20MG TABLETS]; TAKE 1 TABLET(20 MG) BY MOUTH DAILY  Dispense: 90 tablet; Refill: 0    If protocol passes may refill for 12 months if within 3 months of last provider visit (or a total of 15 months).

## 2021-06-13 NOTE — TELEPHONE ENCOUNTER
I called to follow up and make sure this has been taken care of. Patient stated that he came in and had this addressed.     Arabella Rodriguez, A

## 2021-06-14 NOTE — PROGRESS NOTES
Assessment/ Plan     1. Gastroesophageal reflux disease without esophagitis    Reviewed his symptoms  He will continue omeprazole 40 mg daily.  Discussed potential risks of long-term use of protein pump inhibitors  Patient notes that if he does not take the medication and his symptoms return  Discussed a possible upper endoscopy if symptoms are not well controlled    We will check a basic metabolic panel in the near future  - Lipid Cascade  - Basic Metabolic Panel    2. Neck pain  Likely muscular pain involving the superior aspect of the trapezius    Recommend systematic treatment including application of cool packs or heat  He may take ibuprofen or Tylenol as needed  Recommend cyclobenzaprine as needed.  Discussed potential for drowsiness  He has followed up with a chiropractor  If not improving then consider referral for physical therapy    3. Nicotine Dependence    Strongly recommend tobacco cessation  Counseled patient regarding tobacco cessation methods    4.  Healthcare maintenance    Follow-up at a later date to check a lipid cascade and basic metabolic panel with glucose given a family history of diabetes  Updated family history    25 minutes were spent with the patient and greater than 50% of the time was spent in face to face counseling and coordination of care        Subjective:       Christian Judd is a 42 y.o. male who presents to the clinic for a medication check.  He has a history of gastroesophageal reflux symptoms and continues take omeprazole 40 mg daily.  His last office visit was 2 years ago.  He notes that he has required the medication.  If he does not take the medication he does get breakthrough symptoms.  However, when taking omeprazole continuously he does not have breakthrough symptoms.  He reports that he generally has been feeling well.  He continues to smoke cigarettes and has not recently tried to quit.  He smokes approximately 1 pack per day.  Also, he recently developed some pain  "involving the superior aspect of his left upper back.  He has had some discomfort that radiates to the left side of his neck.  Denies obvious injury but notes that his pain worsened when rolling over.  He denies radiation to his left upper extremity.    The following portions of the patient's history were reviewed and updated as appropriate: allergies, current medications, past family history, past medical history, past social history, past surgical history and problem list.    Review of Systems   A 12 point comprehensive review of systems was negative except as noted.      Current Outpatient Prescriptions   Medication Sig Dispense Refill     omeprazole (PRILOSEC) 40 MG capsule TAKE 1 CAPSULE BY MOUTH EVERYDAY. AN APPOINTMENT IS NEEDED PRIOR TO NEXT REFILL. 60 capsule 0     albuterol (PROVENTIL HFA;VENTOLIN HFA) 90 mcg/actuation inhaler Inhale 2 puffs every 4 (four) hours as needed for wheezing. 1-2 puffs every 4-6 hours as needed. 1 Inhaler 5     cyclobenzaprine (FLEXERIL) 10 MG tablet Take 1 tablet (10 mg total) by mouth 3 (three) times a day as needed for muscle spasms. 30 tablet 0     No current facility-administered medications for this visit.        Objective:      /74  Pulse 64  Temp 97.4  F (36.3  C) (Oral)   Resp 16  Ht 5' 10\" (1.778 m)  Wt 210 lb (95.3 kg)  BMI 30.13 kg/m2      General appearance: alert, appears stated age and cooperative  Head: Normocephalic, without obvious abnormality, atraumatic  Eyes: conjunctivae/corneas clear. PERRL, EOM's intact.   Nose: Nares normal. Septum midline. Mucosa normal. No drainage or sinus tenderness.  Throat: lips, mucosa, and tongue normal; teeth and gums normal  Neck: no adenopathy.  There is some discomfort to palpation along the superior aspect of the trapezius on the left  There is no obvious nuchal rigidity or cervical adenopathy  Back: symmetric, no curvature. ROM normal. No CVA tenderness.  Lungs: clear to auscultation bilaterally  Heart: regular " rate and rhythm, S1, S2 normal, no murmur, click, rub or gallop  Extremities: extremities normal, atraumatic, no cyanosis or edema  Skin: Skin color, texture, turgor normal. No rashes or lesions  Lymph nodes: Cervical nodes normal.  Neurologic: Alert and oriented X 3. Normal coordination and gait         No results found for this or any previous visit (from the past 168 hour(s)).       This note has been dictated using voice recognition software. Any grammatical or context distortions are unintentional and inherent to the software

## 2021-06-19 NOTE — LETTER
Letter by Rg Monique MD at      Author: Rg Monique MD Service: -- Author Type: --    Filed:  Encounter Date: 4/12/2019 Status: (Other)         Christian Judd  [id number]    [place] [facility] [id number]             April 12, 2019         Dear Mr. Judd,    Below are the results from your recent visit:    Resulted Orders   Lipid Cascade   Result Value Ref Range    Cholesterol 251 (H) <=199 mg/dL    Triglycerides 80 <=149 mg/dL    HDL Cholesterol 37 (L) >=40 mg/dL    LDL Calculated 198 (H) <=129 mg/dL    Patient Fasting > 8hrs? Yes    Basic Metabolic Panel   Result Value Ref Range    Sodium 140 136 - 145 mmol/L    Potassium 3.9 3.5 - 5.0 mmol/L    Chloride 106 98 - 107 mmol/L    CO2 23 22 - 31 mmol/L    Anion Gap, Calculation 11 5 - 18 mmol/L    Glucose 73 70 - 125 mg/dL    Calcium 9.8 8.5 - 10.5 mg/dL    BUN 19 8 - 22 mg/dL    Creatinine 0.86 0.70 - 1.30 mg/dL    GFR MDRD Af Amer >60 >60 mL/min/1.73m2    GFR MDRD Non Af Amer >60 >60 mL/min/1.73m2    Narrative    Fasting Glucose reference range is 70-99 mg/dL per  American Diabetes Association (ADA) guidelines.             Christian,    Here are your test results.  Your kidney function tests are normal and your blood sugar test is also normal.    Your cholesterol numbers remain significantly elevated.  This is likely a combination of genetics as well as your diet.  As we discussed, you can make improvements in your diet and exercise.  However, your numbers are significantly elevated and in calculating your cardiovascular risk you do meet the criteria for a cholesterol-lowering medication.  I recommend that you consider a medication such as atorvastatin/Lipitor.  Please let me know if you would like to consider this my concerns to your pharmacy.  I do recommend rechecking your cholesterol numbers in 3 months.    Please call with questions or contact us using [employer].    Sincerely,        Electronically signed by Rg Monique MD

## 2021-06-21 NOTE — LETTER
Letter by Lida Cheng MD at      Author: Lida Cheng MD Service: -- Author Type: --    Filed:  Encounter Date: 11/3/2020 Status: (Other)         Christian Judd  [address]    [facility] [id number]      November 3, 2020      Dear Christian Judd,   : 1975      This letter is in regards to the appointment that you had scheduled on 20 at the [facility] with Dr. Cheng.     The [facility] strives to see all patients in a timely manner and we need your help to achieve this.  The above-mentioned appointment was missed and we do not have record of a cancellation by you.  Whenever possible, we request appointment cancellations at least 72 hours in advance.  This time allows us to offer the appointment to another patient in need.      If you feel you have received this letter in error, or if you need to reschedule this appointment, please call our office so that we may update our records.      Sincerely,    [facility]

## 2021-06-25 NOTE — TELEPHONE ENCOUNTER
Please call.  I refilled his omeprazole but he has not been seen since 2017.  I refilled for 90 days but he will need to be seen prior to his next refill.

## 2021-06-25 NOTE — TELEPHONE ENCOUNTER
RN cannot approve Refill Request    RN can NOT refill this medication overdue for office visits and/or labs.    Vipul Castaneda, [facility] Triage/Med Refill 3/14/2019    Requested Prescriptions   Pending Prescriptions Disp Refills     omeprazole (PRILOSEC) 40 MG capsule [Pharmacy Med Name: OMEPRAZOLE 40MG CAPSULES] 90 capsule 0     Sig: TAKE 1 CAPSULE BY MOUTH EVERYDAY. AN APPOINTMENT IS NEEDED PRIOR TO NEXT REFILL.    GI Medications Refill Protocol Failed - 3/10/2019  2:29 PM       Failed - PCP or prescribing provider visit in last 12 or next 3 months.    Last office visit with prescriber/PCP: 12/22/2017 Rg Monique MD OR same dept: Visit date not found OR same specialty: 12/22/2017 Rg Monique MD  Last physical: Visit date not found Last MTM visit: Visit date not found   Next visit within 3 mo: Visit date not found  Next physical within 3 mo: Visit date not found  Prescriber OR PCP: Rg Monique MD  Last diagnosis associated with med order: 1. GERD without esophagitis  - omeprazole (PRILOSEC) 40 MG capsule [Pharmacy Med Name: OMEPRAZOLE 40MG CAPSULES]; TAKE 1 CAPSULE BY MOUTH EVERYDAY. AN APPOINTMENT IS NEEDED PRIOR TO NEXT REFILL.  Dispense: 90 capsule; Refill: 0    If protocol passes may refill for 12 months if within 3 months of last provider visit (or a total of 15 months).

## 2021-07-24 ENCOUNTER — HEALTH MAINTENANCE LETTER (OUTPATIENT)
Age: 46
End: 2021-07-24

## 2021-09-18 ENCOUNTER — HEALTH MAINTENANCE LETTER (OUTPATIENT)
Age: 46
End: 2021-09-18

## 2021-12-14 DIAGNOSIS — E78.5 HYPERLIPIDEMIA, UNSPECIFIED HYPERLIPIDEMIA TYPE: Primary | ICD-10-CM

## 2021-12-14 NOTE — TELEPHONE ENCOUNTER
Patient has an appt with you on 12/23/21 - He's needing a refill for his Atorvastatin.  I cued up for #30 to bridge until his appt. Please review. Thanks Dr. Witt!!

## 2021-12-15 RX ORDER — ATORVASTATIN CALCIUM 20 MG/1
20 TABLET, FILM COATED ORAL DAILY
Qty: 30 TABLET | Refills: 0 | Status: SHIPPED | OUTPATIENT
Start: 2021-12-15 | End: 2021-12-29

## 2021-12-23 ENCOUNTER — OFFICE VISIT (OUTPATIENT)
Dept: FAMILY MEDICINE | Facility: CLINIC | Age: 46
End: 2021-12-23
Payer: COMMERCIAL

## 2021-12-23 VITALS
SYSTOLIC BLOOD PRESSURE: 112 MMHG | TEMPERATURE: 97.7 F | HEIGHT: 70 IN | DIASTOLIC BLOOD PRESSURE: 74 MMHG | HEART RATE: 70 BPM | BODY MASS INDEX: 30.64 KG/M2 | WEIGHT: 214 LBS

## 2021-12-23 DIAGNOSIS — G89.29 CHRONIC PAIN OF RIGHT KNEE: ICD-10-CM

## 2021-12-23 DIAGNOSIS — E78.5 HYPERLIPIDEMIA, UNSPECIFIED HYPERLIPIDEMIA TYPE: Primary | ICD-10-CM

## 2021-12-23 DIAGNOSIS — Z87.891 HISTORY OF TOBACCO USE: ICD-10-CM

## 2021-12-23 DIAGNOSIS — M18.11 PRIMARY OSTEOARTHRITIS OF FIRST CARPOMETACARPAL JOINT OF RIGHT HAND: ICD-10-CM

## 2021-12-23 DIAGNOSIS — M25.561 CHRONIC PAIN OF RIGHT KNEE: ICD-10-CM

## 2021-12-23 DIAGNOSIS — K21.9 GASTROESOPHAGEAL REFLUX DISEASE WITHOUT ESOPHAGITIS: ICD-10-CM

## 2021-12-23 LAB
ALBUMIN SERPL-MCNC: 4.1 G/DL (ref 3.5–5)
ALP SERPL-CCNC: 91 U/L (ref 45–120)
ALT SERPL W P-5'-P-CCNC: 17 U/L (ref 0–45)
ANION GAP SERPL CALCULATED.3IONS-SCNC: 9 MMOL/L (ref 5–18)
AST SERPL W P-5'-P-CCNC: 21 U/L (ref 0–40)
BILIRUB SERPL-MCNC: 0.3 MG/DL (ref 0–1)
BUN SERPL-MCNC: 14 MG/DL (ref 8–22)
CALCIUM SERPL-MCNC: 9.6 MG/DL (ref 8.5–10.5)
CHLORIDE BLD-SCNC: 103 MMOL/L (ref 98–107)
CHOLEST SERPL-MCNC: 188 MG/DL
CO2 SERPL-SCNC: 26 MMOL/L (ref 22–31)
CREAT SERPL-MCNC: 0.85 MG/DL (ref 0.7–1.3)
FASTING STATUS PATIENT QL REPORTED: YES
GFR SERPL CREATININE-BSD FRML MDRD: >90 ML/MIN/1.73M2
GLUCOSE BLD-MCNC: 76 MG/DL (ref 70–125)
HDLC SERPL-MCNC: 37 MG/DL
LDLC SERPL CALC-MCNC: 127 MG/DL
POTASSIUM BLD-SCNC: 4.4 MMOL/L (ref 3.5–5)
PROT SERPL-MCNC: 7 G/DL (ref 6–8)
SODIUM SERPL-SCNC: 138 MMOL/L (ref 136–145)
TRIGL SERPL-MCNC: 118 MG/DL

## 2021-12-23 PROCEDURE — 80053 COMPREHEN METABOLIC PANEL: CPT | Performed by: FAMILY MEDICINE

## 2021-12-23 PROCEDURE — 99214 OFFICE O/P EST MOD 30 MIN: CPT | Performed by: FAMILY MEDICINE

## 2021-12-23 PROCEDURE — 36415 COLL VENOUS BLD VENIPUNCTURE: CPT | Performed by: FAMILY MEDICINE

## 2021-12-23 PROCEDURE — 80061 LIPID PANEL: CPT | Performed by: FAMILY MEDICINE

## 2021-12-23 RX ORDER — ATORVASTATIN CALCIUM 20 MG/1
20 TABLET, FILM COATED ORAL DAILY
Qty: 30 TABLET | Refills: 0 | Status: CANCELLED | OUTPATIENT
Start: 2021-12-23

## 2021-12-23 RX ORDER — OMEPRAZOLE 40 MG/1
CAPSULE, DELAYED RELEASE ORAL
COMMUNITY
Start: 2020-11-09 | End: 2022-01-17

## 2021-12-23 ASSESSMENT — MIFFLIN-ST. JEOR: SCORE: 1846.95

## 2021-12-23 NOTE — PATIENT INSTRUCTIONS
HOW TO QUIT SMOKING  Smoking is one of the hardest habits to break. About half of all those who have ever smoked have been able to quit, and most of those (about 70%) who still smoke want to quit. Here are some of the best ways to stop smoking.     KEEP TRYING:  It takes most smokers about 8 tries before they are finally able to fully quit. So, the more often you try and fail, the better your chance of quitting the next time! So, don't give up!    GO COLD TURKEY:  Most ex-smokers quit cold turkey. Trying to cut back gradually doesn't seem to work as well, perhaps because it continues the smoking habit. Also, it is possible to fool yourself by inhaling more while smoking fewer cigarettes. This results in the same amount of nicotine in your body!    GET SUPPORT:  Support programs can make an important difference, especially for the heavy smoker. These groups offer lectures, methods to change your behavior and peer support. Call the free national Quitline for more information. 800-QUIT-NOW (663-386-8786). Low-cost or free programs are offered by many hospitals, local chapters of the American Lung Association (637-826-0781) and the American Cancer Society (581-040-4032). Support at home is important too. Non-smokers can help by offering praise and encouragement. If the smoker fails to quit, encourage them to try again!    OVER-THE-COUNTER MEDICINES:  For those who can't quit on their own, Nicotine Replacement Therapy (NRT) may make quitting much easier. Certain aids such as the nicotine patch, gum and lozenge are available without a prescription. However, it is best to use these under the guidance of your doctor. The skin patch provides a steady supply of nicotine to the body. Nicotine gum and lozenge gives temporary bursts of low levels of nicotine. Both methods take the edge off the craving for cigarettes. WARNING: If you feel symptoms of nicotine overdose, such as nausea, vomiting, dizziness, weakness, or fast  heartbeat, stop using these and see your doctor.    PRESCRIPTION MEDICINES:  After evaluating your smoking patterns and prior attempts at quitting, your doctor may offer a prescription medicine such as bupropion (Zyban, Wellbutrin), varenicline (Chantix, Champix), a niocotine inhaler or nasal spray. Each has its unique advantage and side effects which your doctor can review with you.    For information about how to quit smoking, visit the following links:  National [employer] ,   Clearing the Air, Quit Smoking Today   - an online booklet. http://www.smokefree.gov/pubs/clearing_the_air.pdf  Smokefree.gov http://smokefree.gov/  QuitNet http://www.quitnet.com/      As always, please call with any questions or concerns. I look forward to seeing you again soon!    Patient Education     Osteoarthritis  Osteoarthritis happens when the cartilage in a joint becomes damaged and worn. This may be from age, wear and tear, overuse of the joint, obesity, or other problems. Osteoarthritis can affect any joint. But it's most common in hands, knees, spine, hips, and feet. Symptoms include joint stiffness, and pain. It's also called degenerative joint disease.   Home care  When a joint is more sore than usual, rest it for a day or two.  Heat can help relieve stiffness. Take a hot bath or apply a heating pad for up to 30 minutes at a time. If symptoms are worse in the morning, using heat just after awakening can help relax the muscle and soothe the joints.   Ice helps relieve pain. It's often used after activity. Use a cold pack wrapped in a thin cloth on the joint for 10 to 15 minutes at a time.   Alternating hot and cold can also help relieve pain. Try this for 20 minutes at a time, several times per day.  Exercise helps prevent the muscles and ligaments around the joint from becoming weak. It also helps maintain function in the joint. Be as active as you can. Talk to your healthcare provider about what activity program is  best for you.  Excess weight puts a lot of extra strain on weight-bearing joints of the lower back, hips, knees, feet and ankles. If you are overweight, talk to your healthcare provider about a safe and effective weight loss program.  Use anti-inflammatory medicines as prescribed for pain. This includes acetaminophen or NSAIDs such as ibuprofen or naproxen. Don't take NSAIDs if your healthcare provider has told you that you can't take NSAIDS because of other health problems If needed, topical or injected medicines may be recommended. Talk with your healthcare provider if these options are not enough to manage your pain. Follow the directions on all over-the-counter medicines.  Talk with your healthcare provider about devices that might help improve your function and reduce pain.  Talk with you healthcare provider about physical therapy to help strengthen your joints and the surrounding muscles.    Follow-up care  Follow up with your healthcare provider, or as advised.   When to seek medical advice  Call your healthcare provider right away if any of these occur:  Redness or swelling of a painful joint  Discharge or pus from a painful joint  Fever of 100.4 F (38 C) or higher, or as directed by your healthcare provider  Worsening joint pain  Decreased ability to move the joint or bear weight on the joint  [employer] last reviewed this educational content on 8/1/2019 2000-2021 [employer]. All rights reserved. This information is not intended as a substitute for professional medical care. Always follow your healthcare professional's instructions.

## 2021-12-23 NOTE — PROGRESS NOTES
Assessment & Plan     Hyperlipidemia, unspecified hyperlipidemia type  Currently on atorvastatin 20 mg daily.  Recheck fasting labs today and provide refill with dose adjustment if needed  - Lipid Profile (Chol, Trig, HDL, LDL calc)  - Comprehensive metabolic panel (BMP + Alb, Alk Phos, ALT, AST, Total. Bili, TP)    History of tobacco use  Continues to smoke 1 pack/day.  Patient admits to being precontemplative regarding cessation.  Discussed audible changes throughout his lung fields on exam and importance of quitting.  He will let us know when he is ready for additional resources.    Gastroesophageal reflux disease without esophagitis  Has been on high-dose omeprazole for quite some time due to reflux symptoms.  We discussed long-term risks of this medication and recommended tapering down.  If symptoms persist, would recommend EGD.    Primary osteoarthritis of first carpometacarpal joint of right hand  Suspect primary OA due to symptoms of overuse as patient works as a  and is right-handed.  We reviewed conservative measures including rest, ice, and NSAIDs.  He will consider hand therapy and an orthopedic referral for possible injection.    Chronic pain of right knee  Due to chronic left ankle pain for which he has had multiple surgeries, he has been compensating and putting more pressure on his right knee.  He has subsequently developed medial right knee pain.  He has mild tenderness of the medial joint line on exam today but otherwise knee integrity is intact.  Again, we reviewed conservative measures.  He declined PT or orthopedic referral.    Latha Witt DO  [facility]    Gabe Gonzales is a 46 year old who presents for the following health issues  Chief Complaint   Patient presents with     Recheck Medication     refill of atorvastatin, fasting     HPI     Hyperlipidemia :  Currently on atorvastatin 20 mg daily.  Patient is fasting today.  Tolerating the medication  "fine.    Right thumb pain:  Worsening base of right thumb pain over the past year.  It occasionally occurs in the left thumb.  Patient is right-handed and works as a  so does a lot of cutting.  He has not noticed redness, warmth, swelling.  He is taken ibuprofen in the past with some relief.  He can feel stiff and sore at times.    Right knee pain:  Atraumatic, gradual pain over the past year.  No prior surgeries or injuries he is aware of.  Typically on the medial knee.  Can be a sharp pain when getting in and out of bed.  Intermittently will use ibuprofen.  Denies redness, warmth, swelling.  He admits he has been compensating due to his left ankle pain which she has had prior surgeries on so has been putting more pressure on his knee.    Tobacco use:  Currently smoking 1 pack/day.  Was able to quit for 3 months with the patch.  However, both him and his wife got laid off from their jobs due to Covid so his wife began smoking and he restarted.  He is not ready to quit yet.  He occasionally wheezes and has a productive cough.  He denies shortness of breath.        Objective    /74   Pulse 70   Temp 97.7  F (36.5  C) (Oral)   Ht 1.778 m (5' 10\")   Wt 97.1 kg (214 lb)   BMI 30.71 kg/m    Body mass index is 30.71 kg/m .  Physical Exam   GENERAL: healthy, alert and no distress  RESP: Rhonchi throughout, left greater than right, faint expiratory wheeze  CV: regular rate and rhythm, normal S1 S2, no S3 or S4, no murmur, click or rub, no peripheral edema and peripheral pulses strong  SKIN: no suspicious lesions or rashes  PSYCH: mentation appears normal, affect normal/bright  MSK: Local tenderness right thumb MCP without redness, warmth, swelling, full range of motion. Right knee: Grossly normal in appearance. No overlying erythema or ecchymosis. No swelling noted.  Mild medial joint line tenderness with palpation.  Full ROM with extension and flexion. Negative Lachman's and posterior drawer. No laxity with " varus and valgus stress. Negative McMurrays. No patellar apprehension or pain with patellar compression. Pes anserine bursa nontender. CMS intact.

## 2021-12-29 RX ORDER — ATORVASTATIN CALCIUM 20 MG/1
20 TABLET, FILM COATED ORAL DAILY
Qty: 90 TABLET | Refills: 3 | Status: SHIPPED | OUTPATIENT
Start: 2021-12-29 | End: 2023-01-12

## 2022-01-16 DIAGNOSIS — K21.9 GASTROESOPHAGEAL REFLUX DISEASE WITHOUT ESOPHAGITIS: Primary | ICD-10-CM

## 2022-01-17 RX ORDER — OMEPRAZOLE 40 MG/1
CAPSULE, DELAYED RELEASE ORAL
Qty: 90 CAPSULE | Refills: 1 | Status: SHIPPED | OUTPATIENT
Start: 2022-01-17 | End: 2022-07-23

## 2022-07-20 DIAGNOSIS — K21.9 GASTROESOPHAGEAL REFLUX DISEASE WITHOUT ESOPHAGITIS: ICD-10-CM

## 2022-07-20 NOTE — TELEPHONE ENCOUNTER
"  Routing refill request to provider for review/approval because:  PCP to evaluate appropriateness of ongoing prescription, see notation above from 12/23/21 visit with Dr. Witt    Last Written Prescription Date: 12/23/21  Last Fill Quantity: 90, # refills: 1  Last office visit provider: Eduar 12/23/21        Requested Prescriptions   Pending Prescriptions Disp Refills    omeprazole (PRILOSEC) 40 MG DR capsule [Pharmacy Med Name: OMEPRAZOLE 40MG CAPSULES] 90 capsule 1     Sig: TAKE 1 CAPSULE BY MOUTH EVERY DAY        PPI Protocol Passed - 7/20/2022  3:28 AM        Passed - Not on Clopidogrel (unless Pantoprazole ordered)        Passed - No diagnosis of osteoporosis on record        Passed - Recent (12 mo) or future (30 days) visit within the authorizing provider's specialty     Patient has had an office visit with the authorizing provider or a provider within the authorizing providers department within the previous 12 mos or has a future within next 30 days. See \"Patient Info\" tab in inbasket, or \"Choose Columns\" in Meds & Orders section of the refill encounter.              Passed - Medication is active on med list        Passed - Patient is age 18 or older                  Cyn Garsia RN 07/20/22 11:33 AM        "

## 2022-07-22 NOTE — TELEPHONE ENCOUNTER
Latha,    I will defer this to you as I have not seen Christian since 2019. You can refuse the medication if you do not feel comfortable. If so, we should have patient called to make an appointment. Thanks!

## 2022-07-23 RX ORDER — OMEPRAZOLE 40 MG/1
CAPSULE, DELAYED RELEASE ORAL
Qty: 90 CAPSULE | Refills: 1 | Status: SHIPPED | OUTPATIENT
Start: 2022-07-23 | End: 2023-01-12

## 2022-08-14 ENCOUNTER — HEALTH MAINTENANCE LETTER (OUTPATIENT)
Age: 47
End: 2022-08-14

## 2022-10-11 DIAGNOSIS — E78.5 HYPERLIPIDEMIA, UNSPECIFIED HYPERLIPIDEMIA TYPE: ICD-10-CM

## 2022-10-11 RX ORDER — ATORVASTATIN CALCIUM 20 MG/1
TABLET, FILM COATED ORAL
Qty: 90 TABLET | Refills: 3 | OUTPATIENT
Start: 2022-10-11

## 2022-11-19 ENCOUNTER — HEALTH MAINTENANCE LETTER (OUTPATIENT)
Age: 47
End: 2022-11-19

## 2023-01-12 ENCOUNTER — TELEPHONE (OUTPATIENT)
Dept: FAMILY MEDICINE | Facility: CLINIC | Age: 48
End: 2023-01-12

## 2023-01-12 DIAGNOSIS — K21.9 GASTROESOPHAGEAL REFLUX DISEASE WITHOUT ESOPHAGITIS: ICD-10-CM

## 2023-01-12 DIAGNOSIS — E78.5 HYPERLIPIDEMIA, UNSPECIFIED HYPERLIPIDEMIA TYPE: ICD-10-CM

## 2023-01-12 RX ORDER — OMEPRAZOLE 40 MG/1
40 CAPSULE, DELAYED RELEASE ORAL DAILY
Qty: 90 CAPSULE | Refills: 0 | Status: SHIPPED | OUTPATIENT
Start: 2023-01-12 | End: 2023-02-09

## 2023-01-12 RX ORDER — ATORVASTATIN CALCIUM 20 MG/1
20 TABLET, FILM COATED ORAL DAILY
Qty: 90 TABLET | Refills: 0 | Status: SHIPPED | OUTPATIENT
Start: 2023-01-12 | End: 2023-02-09

## 2023-01-12 NOTE — TELEPHONE ENCOUNTER
Medication Question or Refill    Contacts       Type Contact Phone/Fax    01/12/2023 12:05 PM CST Phone (Incoming) TAYLOR BROWN (Emergency Contact) [phone]          What medication are you calling about (include dose and sig)?: Atorvastatin & Omeprazole    Controlled Substance Agreement on file:   CSA -- Patient Level:    CSA: None found at the patient level.       Who prescribed the medication?: Moriarity    Do you need a refill? Yes: Currently out, needs refill - appt is scheduled for 2/9/23    When did you use the medication last? 1/5/23    Patient offered an appointment? No    Do you have any questions or concerns?  No    Preferred Pharmacy:   [id number] - [address]  [facility] [address]  [facility] [address]  Phone: [phone] Fax: [phone]      Could we send this information to you in [facility] or would you prefer to receive a phone call?:   No preference   Okay to leave a detailed message?: Yes at Cell number on file:    Telephone Information:   Mobile [phone]

## 2023-02-09 ENCOUNTER — VIRTUAL VISIT (OUTPATIENT)
Dept: FAMILY MEDICINE | Facility: CLINIC | Age: 48
End: 2023-02-09
Payer: COMMERCIAL

## 2023-02-09 DIAGNOSIS — K21.9 GASTROESOPHAGEAL REFLUX DISEASE WITHOUT ESOPHAGITIS: ICD-10-CM

## 2023-02-09 DIAGNOSIS — J45.40 MODERATE PERSISTENT ASTHMA WITHOUT COMPLICATION: ICD-10-CM

## 2023-02-09 DIAGNOSIS — Z12.11 SCREEN FOR COLON CANCER: Primary | ICD-10-CM

## 2023-02-09 DIAGNOSIS — E78.5 HYPERLIPIDEMIA, UNSPECIFIED HYPERLIPIDEMIA TYPE: ICD-10-CM

## 2023-02-09 DIAGNOSIS — Z87.891 HISTORY OF TOBACCO USE: ICD-10-CM

## 2023-02-09 PROCEDURE — 99213 OFFICE O/P EST LOW 20 MIN: CPT | Mod: VID | Performed by: FAMILY MEDICINE

## 2023-02-09 RX ORDER — FLUTICASONE PROPIONATE 220 UG/1
1 AEROSOL, METERED RESPIRATORY (INHALATION) 2 TIMES DAILY
Qty: 12 G | Refills: 3 | Status: SHIPPED | OUTPATIENT
Start: 2023-02-09 | End: 2023-11-17

## 2023-02-09 RX ORDER — ALBUTEROL SULFATE 90 UG/1
2 AEROSOL, METERED RESPIRATORY (INHALATION) EVERY 6 HOURS PRN
Qty: 18 G | Refills: 3 | Status: SHIPPED | OUTPATIENT
Start: 2023-02-09 | End: 2023-11-17

## 2023-02-09 RX ORDER — ATORVASTATIN CALCIUM 20 MG/1
20 TABLET, FILM COATED ORAL DAILY
Qty: 90 TABLET | Refills: 1 | Status: SHIPPED | OUTPATIENT
Start: 2023-02-09 | End: 2023-04-10

## 2023-02-09 RX ORDER — OMEPRAZOLE 40 MG/1
40 CAPSULE, DELAYED RELEASE ORAL DAILY
Qty: 90 CAPSULE | Refills: 1 | Status: SHIPPED | OUTPATIENT
Start: 2023-02-09 | End: 2023-04-10

## 2023-02-09 ASSESSMENT — ASTHMA QUESTIONNAIRES: ACT_TOTALSCORE: 18

## 2023-02-09 NOTE — PROGRESS NOTES
"Christian is a 47 year old who is being evaluated via a billable video visit.      How would you like to obtain your AVS? MyChart  If the video visit is dropped, the invitation should be resent by: Text to cell phone: [phone]  Will anyone else be joining your video visit? No  {If patient encounters technical issues they should call [phone] :100403}        {PROVIDER CHARTING PREFERENCE:946786}    Subjective   Christian is a 47 year old, presenting for the following health issues:  Recheck Medication      History of Present Illness     Asthma:  He presents for follow up of asthma.  He has some cough, some wheezing, and some shortness of breath. He is using a relief medication a few times a week. He does not have a controller medication. Patient is aware of the following triggers: unaware of any triggers. The patient has not had a visit to the Emergency Room, Urgent Care or Hospital due to asthma since the last clinic visit.     He eats 0-1 servings of fruits and vegetables daily.He consumes 1 sweetened beverage(s) daily.He exercises with enough effort to increase his heart rate 9 or less minutes per day.  He exercises with enough effort to increase his heart rate 3 or less days per week.   He is taking medications regularly.       {SUPERLIST (Optional):885348}  {additonal problems for provider to add (Optional):883294}    Review of Systems   {ROS COMP (Optional):695516}      Objective           Vitals:  No vitals were obtained today due to virtual visit.    Physical Exam   {video visit exam brief selected:688494::\"GENERAL: Healthy, alert and no distress\",\"EYES: Eyes grossly normal to inspection.  No discharge or erythema, or obvious scleral/conjunctival abnormalities.\",\"RESP: No audible wheeze, cough, or visible cyanosis.  No visible retractions or increased work of breathing.  \",\"SKIN: Visible skin clear. No significant rash, abnormal pigmentation or lesions.\",\"NEURO: Cranial nerves grossly intact.  Mentation and " "speech appropriate for age.\",\"PSYCH: Mentation appears normal, affect normal/bright, judgement and insight intact, normal speech and appearance well-groomed.\"}    {Diagnostic Test Results (Optional):999589}    {AMBULATORY ATTESTATION (Optional):373624}        Video-Visit Details    Type of service:  Video Visit   Video Start Time: {video visit start/end time for provider to select:831977}  Video End Time:{video visit start/end time for provider to select:526309}    Originating Location (pt. Location): {video visit patient location:274469::\"Home\"}  {PROVIDER LOCATION On-site should be selected for visits conducted from your clinic location or adjoining [facility] hospital, academic office, or other nearby [facility] building. Off-site should be selected for all other provider locations, including home:439045}  Distant Location (provider location):  {virtual location provider:639125}  Platform used for Video Visit: {Virtual Visit Platforms:473637::\"[employer]\"}    "

## 2023-02-09 NOTE — PROGRESS NOTES
Christian is a 47 year old who is being evaluated via a billable video visit.      How would you like to obtain your AVS? MyChart  If the video visit is dropped, the invitation should be resent by: Text to cell phone: [phone]  Will anyone else be joining your video visit? No          Assessment & Plan       ICD-10-CM    1. Screen for colon cancer  Z12.11 Colonoscopy Screening  Referral      2. Hyperlipidemia, unspecified hyperlipidemia type  E78.5 atorvastatin (LIPITOR) 20 MG tablet      3. Gastroesophageal reflux disease without esophagitis  K21.9 omeprazole (PRILOSEC) 40 MG DR capsule      4. History of tobacco use  Z87.891       5. Moderate persistent asthma without complication  J45.40 fluticasone (FLOVENT HFA) 220 MCG/ACT inhaler     albuterol (PROAIR HFA/PROVENTIL HFA/VENTOLIN HFA) 108 (90 Base) MCG/ACT inhaler        Set physical with your primary and come fasting for labs.    Refilled albuterol inhaler.    Flovent 220 mcg,  increase from 1 puff daily to 1 puff twice a day.    No pets in bedroom.    Refilled Atorvastatin and Omeprazole. 90 tabs and 1 refill.    Ordered colonoscopy.      20 minutes spent on the date of the encounter doing chart review, history and exam, documentation and further activities per the note       Nicotine/Tobacco Cessation:  He reports that he has been smoking. He has never used smokeless tobacco.  Nicotine/Tobacco Cessation Plan:   Information offered: Patient not interested at this time          No follow-ups on file.    Susana Singh MD  [facility]    Subjective   Christian is a 47 year old, presenting for the following health issues:  Recheck Medication (Asthma med check/ follow up - pt needs refill on albuterol inhaler-not on current med list. )      History of Present Illness     Asthma:  He presents for follow up of asthma.  He has some cough, some wheezing, and some shortness of breath. He is using a relief medication a few times a  week. He does not have a controller medication. Patient is aware of the following triggers: unaware of any triggers. The patient has not had a visit to the Emergency Room, Urgent Care or Hospital due to asthma since the last clinic visit.     He eats 0-1 servings of fruits and vegetables daily.He consumes 1 sweetened beverage(s) daily.He exercises with enough effort to increase his heart rate 9 or less minutes per day.  He exercises with enough effort to increase his heart rate 3 or less days per week.   He is taking medications regularly.     Asthma: ACT is 18.  States he is on 2 inhalers but does not know what they are.  When asked more details, he is on butyryl inhaler as needed and  Flovent 220 mcg, using 1 puff daily  Lay down or active.   Has pets.  Pet does not sleep in the bedroom.    Hyperlipidemia: Needs refill of atorvastatin.    GERD: On omeprazole and helping.    Smoker:  Not ready to quit.          Review of Systems         Objective           Vitals:  No vitals were obtained today due to virtual visit.    Physical Exam   GENERAL: Healthy, alert and no distress                Video-Visit Details    Type of service:  Video Visit   Video Start Time: 6:00 PM  Video End Time:6:15 PM    Originating Location (pt. Location): Home    Distant Location (provider location):  On-site  Platform used for Video Visit: Tanya

## 2023-02-10 NOTE — PATIENT INSTRUCTIONS
Set physical with your primary and come fasting for labs.    Refilled albuterol inhaler.    Flovent 220 mcg,  increase from 1 puff daily to 1 puff twice a day.    No pets in bedroom.    Refilled Atorvastatin and Omeprazole. 90 tabs and 1 refill.    Ordered colonoscopy.

## 2023-04-10 DIAGNOSIS — K21.9 GASTROESOPHAGEAL REFLUX DISEASE WITHOUT ESOPHAGITIS: ICD-10-CM

## 2023-04-10 DIAGNOSIS — E78.5 HYPERLIPIDEMIA, UNSPECIFIED HYPERLIPIDEMIA TYPE: ICD-10-CM

## 2023-04-10 RX ORDER — OMEPRAZOLE 40 MG/1
CAPSULE, DELAYED RELEASE ORAL
Qty: 90 CAPSULE | Refills: 0 | Status: SHIPPED | OUTPATIENT
Start: 2023-04-10 | End: 2023-10-03

## 2023-04-10 RX ORDER — ATORVASTATIN CALCIUM 20 MG/1
TABLET, FILM COATED ORAL
Qty: 90 TABLET | Refills: 0 | Status: SHIPPED | OUTPATIENT
Start: 2023-04-10 | End: 2023-10-11

## 2023-04-10 NOTE — TELEPHONE ENCOUNTER
Both medications recently filled 2/9/2023 disp 90 with 1 refill to [facility].  Routing remaining refill to the requesting [facility] pharmacy.   Radha Fonseca RN   04/10/23 3:18 PM  [facility] Nurse Advisor

## 2023-09-10 ENCOUNTER — HEALTH MAINTENANCE LETTER (OUTPATIENT)
Age: 48
End: 2023-09-10

## 2023-10-02 DIAGNOSIS — K21.9 GASTROESOPHAGEAL REFLUX DISEASE WITHOUT ESOPHAGITIS: ICD-10-CM

## 2023-10-02 DIAGNOSIS — E78.5 HYPERLIPIDEMIA, UNSPECIFIED HYPERLIPIDEMIA TYPE: ICD-10-CM

## 2023-10-03 RX ORDER — OMEPRAZOLE 40 MG/1
40 CAPSULE, DELAYED RELEASE ORAL DAILY
Qty: 90 CAPSULE | Refills: 0 | Status: SHIPPED | OUTPATIENT
Start: 2023-10-03 | End: 2023-11-17

## 2023-10-05 NOTE — TELEPHONE ENCOUNTER
Please call. There is a refill request for her cholesterol medication but she has not had a cholesterol check since 2021. Also, I am not sure who her primary care MD is please clarify and have patient make an appointment with her primary. She will be due for fasting labs at that time.

## 2023-10-11 RX ORDER — ATORVASTATIN CALCIUM 20 MG/1
20 TABLET, FILM COATED ORAL DAILY
Qty: 30 TABLET | Refills: 0 | Status: SHIPPED | OUTPATIENT
Start: 2023-10-11 | End: 2023-11-08

## 2023-11-08 ENCOUNTER — MYC REFILL (OUTPATIENT)
Dept: FAMILY MEDICINE | Facility: CLINIC | Age: 48
End: 2023-11-08
Payer: COMMERCIAL

## 2023-11-08 ENCOUNTER — MYC MEDICAL ADVICE (OUTPATIENT)
Dept: FAMILY MEDICINE | Facility: CLINIC | Age: 48
End: 2023-11-08
Payer: COMMERCIAL

## 2023-11-08 DIAGNOSIS — E78.5 HYPERLIPIDEMIA, UNSPECIFIED HYPERLIPIDEMIA TYPE: ICD-10-CM

## 2023-11-08 RX ORDER — ATORVASTATIN CALCIUM 20 MG/1
20 TABLET, FILM COATED ORAL DAILY
Qty: 90 TABLET | Refills: 0 | Status: SHIPPED | OUTPATIENT
Start: 2023-11-08 | End: 2023-11-17

## 2023-11-16 ASSESSMENT — ENCOUNTER SYMPTOMS
NAUSEA: 0
FREQUENCY: 0
HEADACHES: 0
MYALGIAS: 0
DYSURIA: 0
JOINT SWELLING: 1
HEMATOCHEZIA: 0
DIZZINESS: 0
ARTHRALGIAS: 1
DIARRHEA: 0
FEVER: 0
PALPITATIONS: 0
CONSTIPATION: 0
NERVOUS/ANXIOUS: 0
HEMATURIA: 0
CHILLS: 0
SORE THROAT: 0
SHORTNESS OF BREATH: 0
HEARTBURN: 0
ABDOMINAL PAIN: 0
COUGH: 0
EYE PAIN: 0

## 2023-11-16 ASSESSMENT — ASTHMA QUESTIONNAIRES: ACT_TOTALSCORE: 21

## 2023-11-17 ENCOUNTER — OFFICE VISIT (OUTPATIENT)
Dept: FAMILY MEDICINE | Facility: CLINIC | Age: 48
End: 2023-11-17
Payer: COMMERCIAL

## 2023-11-17 VITALS
DIASTOLIC BLOOD PRESSURE: 63 MMHG | RESPIRATION RATE: 16 BRPM | TEMPERATURE: 98.5 F | OXYGEN SATURATION: 92 % | WEIGHT: 209 LBS | HEIGHT: 69 IN | HEART RATE: 73 BPM | BODY MASS INDEX: 30.96 KG/M2 | SYSTOLIC BLOOD PRESSURE: 107 MMHG

## 2023-11-17 DIAGNOSIS — E66.811 CLASS 1 OBESITY IN ADULT, UNSPECIFIED BMI, UNSPECIFIED OBESITY TYPE, UNSPECIFIED WHETHER SERIOUS COMORBIDITY PRESENT: ICD-10-CM

## 2023-11-17 DIAGNOSIS — Z83.3 FAMILY HISTORY OF DIABETES MELLITUS: ICD-10-CM

## 2023-11-17 DIAGNOSIS — E78.2 MIXED HYPERLIPIDEMIA: ICD-10-CM

## 2023-11-17 DIAGNOSIS — K21.9 GASTROESOPHAGEAL REFLUX DISEASE WITHOUT ESOPHAGITIS: ICD-10-CM

## 2023-11-17 DIAGNOSIS — J45.40 MODERATE PERSISTENT ASTHMA WITHOUT COMPLICATION: ICD-10-CM

## 2023-11-17 DIAGNOSIS — Z12.11 SCREEN FOR COLON CANCER: ICD-10-CM

## 2023-11-17 DIAGNOSIS — E78.5 HYPERLIPIDEMIA, UNSPECIFIED HYPERLIPIDEMIA TYPE: ICD-10-CM

## 2023-11-17 DIAGNOSIS — R74.01 ELEVATED AST (SGOT): ICD-10-CM

## 2023-11-17 DIAGNOSIS — Z00.00 ROUTINE GENERAL MEDICAL EXAMINATION AT A HEALTH CARE FACILITY: Primary | ICD-10-CM

## 2023-11-17 LAB
ERYTHROCYTE [DISTWIDTH] IN BLOOD BY AUTOMATED COUNT: 12.7 % (ref 10–15)
HBA1C MFR BLD: 5.6 % (ref 0–5.6)
HCT VFR BLD AUTO: 42.1 % (ref 40–53)
HGB BLD-MCNC: 14 G/DL (ref 13.3–17.7)
MCH RBC QN AUTO: 27.6 PG (ref 26.5–33)
MCHC RBC AUTO-ENTMCNC: 33.3 G/DL (ref 31.5–36.5)
MCV RBC AUTO: 83 FL (ref 78–100)
PLATELET # BLD AUTO: 279 10E3/UL (ref 150–450)
RBC # BLD AUTO: 5.07 10E6/UL (ref 4.4–5.9)
WBC # BLD AUTO: 6.7 10E3/UL (ref 4–11)

## 2023-11-17 PROCEDURE — 36415 COLL VENOUS BLD VENIPUNCTURE: CPT | Performed by: PHYSICIAN ASSISTANT

## 2023-11-17 PROCEDURE — 85027 COMPLETE CBC AUTOMATED: CPT | Performed by: PHYSICIAN ASSISTANT

## 2023-11-17 PROCEDURE — 84443 ASSAY THYROID STIM HORMONE: CPT | Performed by: PHYSICIAN ASSISTANT

## 2023-11-17 PROCEDURE — 99396 PREV VISIT EST AGE 40-64: CPT | Mod: 25 | Performed by: PHYSICIAN ASSISTANT

## 2023-11-17 PROCEDURE — 83036 HEMOGLOBIN GLYCOSYLATED A1C: CPT | Performed by: PHYSICIAN ASSISTANT

## 2023-11-17 PROCEDURE — 99214 OFFICE O/P EST MOD 30 MIN: CPT | Mod: 25 | Performed by: PHYSICIAN ASSISTANT

## 2023-11-17 PROCEDURE — 90677 PCV20 VACCINE IM: CPT | Performed by: PHYSICIAN ASSISTANT

## 2023-11-17 PROCEDURE — 90471 IMMUNIZATION ADMIN: CPT | Performed by: PHYSICIAN ASSISTANT

## 2023-11-17 PROCEDURE — 80053 COMPREHEN METABOLIC PANEL: CPT | Performed by: PHYSICIAN ASSISTANT

## 2023-11-17 PROCEDURE — 80061 LIPID PANEL: CPT | Performed by: PHYSICIAN ASSISTANT

## 2023-11-17 PROCEDURE — G0103 PSA SCREENING: HCPCS | Performed by: PHYSICIAN ASSISTANT

## 2023-11-17 RX ORDER — ALBUTEROL SULFATE 90 UG/1
2 AEROSOL, METERED RESPIRATORY (INHALATION) EVERY 6 HOURS PRN
Qty: 18 G | Refills: 3 | Status: SHIPPED | OUTPATIENT
Start: 2023-11-17 | End: 2024-03-19

## 2023-11-17 RX ORDER — ATORVASTATIN CALCIUM 20 MG/1
20 TABLET, FILM COATED ORAL DAILY
Qty: 90 TABLET | Refills: 3 | Status: SHIPPED | OUTPATIENT
Start: 2023-11-17 | End: 2024-03-19

## 2023-11-17 RX ORDER — OMEPRAZOLE 40 MG/1
40 CAPSULE, DELAYED RELEASE ORAL DAILY
Qty: 90 CAPSULE | Refills: 3 | Status: SHIPPED | OUTPATIENT
Start: 2023-11-17 | End: 2024-03-19

## 2023-11-17 RX ORDER — FLUTICASONE PROPIONATE 220 UG/1
1 AEROSOL, METERED RESPIRATORY (INHALATION) 2 TIMES DAILY
Qty: 12 G | Refills: 3 | Status: SHIPPED | OUTPATIENT
Start: 2023-11-17

## 2023-11-17 ASSESSMENT — ENCOUNTER SYMPTOMS
EYE PAIN: 0
COUGH: 0
CONSTIPATION: 0
HEADACHES: 0
ABDOMINAL PAIN: 0
DIARRHEA: 0
PALPITATIONS: 0
FEVER: 0
CHILLS: 0
NERVOUS/ANXIOUS: 0
MYALGIAS: 0
FREQUENCY: 0
ARTHRALGIAS: 1
SHORTNESS OF BREATH: 0
HEMATOCHEZIA: 0
HEMATURIA: 0
DYSURIA: 0
DIZZINESS: 0
HEARTBURN: 0
NAUSEA: 0
SORE THROAT: 0
JOINT SWELLING: 1

## 2023-11-17 NOTE — LETTER
My Asthma Action Plan    Name: Christian Judd   YOB: 1975  Date: 11/17/2023   My doctor: Nandini Angel PA-C   My clinic: [facility]        My Control Medicine: Fluticasone propionate (Flovent HFA) - 44 mcg 1 puff BID  My Rescue Medicine: Albuterol (Proair/Ventolin/Proventil HFA) 2-4 puffs EVERY 4 HOURS as needed. Use a spacer if recommended by your provider.   My Asthma Severity:   Moderate Persistent  Know your asthma triggers: upper respiratory infections and cold air               GREEN ZONE   Good Control  I feel good  No cough or wheeze  Can work, sleep and play without asthma symptoms       Take your asthma control medicine every day.     If exercise triggers your asthma, take your rescue medication  15 minutes before exercise or sports, and  During exercise if you have asthma symptoms  Spacer to use with inhaler: If you have a spacer, make sure to use it with your inhaler             YELLOW ZONE Getting Worse  I have ANY of these:  I do not feel good  Cough or wheeze  Chest feels tight  Wake up at night   Keep taking your Green Zone medications  Start taking your rescue medicine:  every 20 minutes for up to 1 hour. Then every 4 hours for 24-48 hours.  If you stay in the Yellow Zone for more than 12-24 hours, contact your doctor.  If you do not return to the Green Zone in 12-24 hours or you get worse, start taking your oral steroid medicine if prescribed by your provider.           RED ZONE Medical Alert - Get Help  I have ANY of these:  I feel awful  Medicine is not helping  Breathing getting harder  Trouble walking or talking  Nose opens wide to breathe       Take your rescue medicine NOW  If your provider has prescribed an oral steroid medicine, start taking it NOW  Call your doctor NOW  If you are still in the Red Zone after 20 minutes and you have not reached your doctor:  Take your rescue medicine again and  Call 911 or go to the emergency room  right away    See your regular doctor within 2 weeks of an Emergency Room or Urgent Care visit for follow-up treatment.          Annual Reminders:  Meet with Asthma Educator,  Flu Shot in the Fall, consider Pneumonia Vaccination for patients with asthma (aged 19 and older).    Pharmacy:    [id number] - [place], [place] - [address] [address] [address] AT [address] [address] & [facility] [place] [facility] - [place], [place] - [address]    Electronically signed by Nandini Angel PA-C   Date: 11/17/23                      Asthma Triggers  How To Control Things That Make Your Asthma Worse    Triggers are things that make your asthma worse.  Look at the list below to help you find your triggers and what you can do about them.  You can help prevent asthma flare-ups by staying away from your triggers.      Trigger                                                          What you can do   Cigarette Smoke  Tobacco smoke can make asthma worse. Do not allow smoking in your home, car or around you.  Be sure no one smokes at a child s day care or school.  If you smoke, ask your health care provider for ways to help you quit.  Ask family members to quit too.  Ask your health care provider for a referral to Quit Plan to help you quit smoking, or call 6-277-164-PLAN.     Colds, Flu, Bronchitis  These are common triggers of asthma. Wash your hands often.  Don t touch your eyes, nose or mouth.  Get a flu shot every year.     Dust Mites  These are tiny bugs that live in cloth or carpet. They are too small to see. Wash sheets and blankets in hot water every week.   Encase pillows and mattress in dust mite proof covers.  Avoid having carpet if you can. If you have carpet, vacuum weekly.   Use a dust mask and HEPA vacuum.   Pollen and Outdoor Mold  Some people are allergic to trees, grass, or weed pollen, or molds. Try to keep your windows closed.  Limit time out doors when pollen count is high.   Ask you health  care provider about taking medicine during allergy season.     Animal Dander  Some people are allergic to skin flakes, urine or saliva from pets with fur or feathers. Keep pets with fur or feathers out of your home.    If you can t keep the pet outdoors, then keep the pet out of your bedroom.  Keep the bedroom door closed.  Keep pets off cloth furniture and away from stuffed toys.     Mice, Rats, and Cockroaches   Some people are allergic to the waste from these pests.   Cover food and garbage.  Clean up spills and food crumbs.  Store grease in the refrigerator.   Keep food out of the bedroom.   Indoor Mold  This can be a trigger if your home has high moisture. Fix leaking faucets, pipes, or other sources of water.   Clean moldy surfaces.  Dehumidify basement if it is damp and smelly.   Smoke, Strong Odors, and Sprays  These can reduce air quality. Stay away from strong odors and sprays, such as perfume, powder, hair spray, paints, smoke incense, paint, cleaning products, candles and new carpet.   Exercise or Sports  Some people with asthma have this trigger. Be active!  Ask your doctor about taking medicine before sports or exercise to prevent symptoms.    Warm up for 5-10 minutes before and after sports or exercise.     Other Triggers of Asthma  Cold air:  Cover your nose and mouth with a scarf.  Sometimes laughing or crying can be a trigger.  Some medicines and food can trigger asthma.

## 2023-11-17 NOTE — PROGRESS NOTES
"SUBJECTIVE:   Christian is a 48 year old, presenting for the following:  Physical        11/17/2023     2:41 PM   Additional Questions   Roomed by YOSEF Ayon CMA([facility])       Healthy Habits:     Getting at least 3 servings of Calcium per day:  Yes    Bi-annual eye exam:  Yes    Dental care twice a year:  Yes    Sleep apnea or symptoms of sleep apnea:  Excessive snoring    Diet:  Regular (no restrictions)    Frequency of exercise:  None    Taking medications regularly:  Yes    Medication side effects:  Not applicable    Additional concerns today:  No      Have you ever done Advance Care Planning? (For example, a Health Directive, POLST, or a discussion with a medical provider or your loved ones about your wishes): No, advance care planning information given to patient to review.  Patient declined advance care planning discussion at this time.    Social History     Tobacco Use    Smoking status: Former    Smokeless tobacco: Never   Substance Use Topics    Alcohol use: Not Currently             11/16/2023     4:11 PM   Alcohol Use   Prescreen: >3 drinks/day or >7 drinks/week? No       Last PSA: No results found for: \"PSA\"    Reviewed orders with patient. Reviewed health maintenance and updated orders accordingly - Yes  Patient Active Problem List   Diagnosis    CARDIOVASCULAR SCREENING; LDL GOAL LESS THAN 160    Esophageal reflux    History of tobacco use    Hyperlipidemia    Moderate persistent asthma without complication    Class 1 obesity in adult, unspecified BMI, unspecified obesity type, unspecified whether serious comorbidity present     Past Surgical History:   Procedure Laterality Date    ORTHOPEDIC SURGERY  Not sure    SOFT TISSUE SURGERY  Not sure       Social History     Tobacco Use    Smoking status: Former     Packs/day: 1.00     Years: 15.00     Additional pack years: 0.00     Total pack years: 15.00     Types: Cigarettes    Smokeless tobacco: Never   Substance Use Topics    Alcohol use: Not Currently     " "Family History   Problem Relation Age of Onset    Hyperlipidemia Father     Diabetes Father     Coronary Artery Disease Father     Diabetes Maternal Grandmother          Current Outpatient Medications   Medication Sig Dispense Refill    albuterol (PROAIR HFA/PROVENTIL HFA/VENTOLIN HFA) 108 (90 Base) MCG/ACT inhaler Inhale 2 puffs into the lungs every 6 hours as needed for shortness of breath, wheezing or cough 18 g 3    atorvastatin (LIPITOR) 20 MG tablet Take 1 tablet (20 mg) by mouth daily 90 tablet 0    fluticasone (FLOVENT HFA) 220 MCG/ACT inhaler Inhale 1 puff into the lungs 2 times daily 12 g 3    omeprazole (PRILOSEC) 40 MG DR capsule TAKE 1 CAPSULE (40 MG) BY MOUTH DAILY 90 capsule 0     Allergies   Allergen Reactions    Nkda [No Known Drug Allergy]      Reviewed and updated as needed this visit by clinical staff   Tobacco  Allergies  Meds              Reviewed and updated as needed this visit by Provider                   Review of Systems   Constitutional:  Negative for chills and fever.   HENT:  Negative for congestion, ear pain, hearing loss and sore throat.    Eyes:  Negative for pain and visual disturbance.   Respiratory:  Negative for cough and shortness of breath.    Cardiovascular:  Negative for chest pain, palpitations and peripheral edema.   Gastrointestinal:  Negative for abdominal pain, constipation, diarrhea, heartburn, hematochezia and nausea.   Genitourinary:  Negative for dysuria, frequency, genital sores, hematuria, impotence and penile discharge.   Musculoskeletal:  Positive for arthralgias and joint swelling. Negative for myalgias.   Skin:  Negative for rash.   Neurological:  Negative for dizziness and headaches.   Psychiatric/Behavioral:  Negative for mood changes. The patient is not nervous/anxious.          OBJECTIVE:   /63   Pulse 73   Temp 98.5  F (36.9  C) (Oral)   Resp 16   Ht 1.753 m (5' 9\")   Wt 94.8 kg (209 lb)   SpO2 92%   BMI 30.86 kg/m      Physical " Exam  GENERAL: healthy, alert and no distress  EYES: Eyes grossly normal to inspection, PERRL and conjunctivae and sclerae normal  HENT: ear canals and TM's normal, nose and mouth without ulcers or lesions  NECK: no adenopathy, no asymmetry, masses, or scars and thyroid normal to palpation  RESP: lungs clear to auscultation - no rales, rhonchi or wheezes  CV: regular rate and rhythm, normal S1 S2, no S3 or S4, no murmur, click or rub, no peripheral edema and peripheral pulses strong  ABDOMEN: soft, nontender, no hepatosplenomegaly, no masses and bowel sounds normal  MS: no gross musculoskeletal defects noted, no edema  SKIN: no suspicious lesions or rashes  NEURO: Normal strength and tone, mentation intact and speech normal  PSYCH: mentation appears normal, affect normal/bright    Diagnostic Test Results:  none     ASSESSMENT/PLAN:   Christian was seen today for physical.    Diagnoses and all orders for this visit:    Routine general medical examination at a health care facility  -     Prostate Specific Antigen Screen; Future  -     CBC with platelets; Future  -     Comprehensive metabolic panel; Future  -     Lipid panel reflex to direct LDL Fasting; Future  -     TSH with free T4 reflex; Future  Labs updated today.  Colonoscopy- ordered  Declines referral for sleep medicine.    Screen for colon cancer  -     Colonoscopy Screening  Referral; Future  Referral placed.     Mixed hyperlipidemia  -     atorvastatin (LIPITOR) 20 MG tablet; Take 1 tablet (20 mg) by mouth daily  Chronic issue, recheck labs.    Gastroesophageal reflux disease without esophagitis  -     omeprazole (PRILOSEC) 40 MG DR capsule; Take 1 capsule (40 mg) by mouth daily  Chronic issue, stable, meds refilled.    Moderate persistent asthma without complication  -     fluticasone (FLOVENT HFA) 220 MCG/ACT inhaler; Inhale 1 puff into the lungs 2 times daily  -     albuterol (PROAIR HFA/PROVENTIL HFA/VENTOLIN HFA) 108 (90 Base) MCG/ACT inhaler;  "Inhale 2 puffs into the lungs every 6 hours as needed for shortness of breath, wheezing or cough  Chronic issue, no recent hospitalizations. ACT score 20, AAP given, meds refilled.    Class 1 obesity in adult, unspecified BMI, unspecified obesity type, unspecified whether serious comorbidity present  Chronic issue, work on lifestyle changes.    Family history of diabetes mellitus  -     Hemoglobin A1c; Future    Other orders  -     Pneumococcal 20 Valent Conjugate (Prevnar 20)        Patient has been advised of split billing requirements and indicates understanding: Yes      COUNSELING:   Reviewed preventive health counseling, as reflected in patient instructions      BMI:   Estimated body mass index is 30.86 kg/m  as calculated from the following:    Height as of this encounter: 1.753 m (5' 9\").    Weight as of this encounter: 94.8 kg (209 lb).   Weight management plan: Discussed healthy diet and exercise guidelines      He reports that he has quit smoking. He has never used smokeless tobacco.            Nandini Angel PA-C  [facility]  "

## 2023-11-17 NOTE — PROGRESS NOTES
Prior to immunization administration, verified patients identity using patient s name and date of birth. Please see Immunization Activity for additional information.     Screening Questionnaire for Adult Immunization    Are you sick today?   No   Do you have allergies to medications, food, a vaccine component or latex?   No   Have you ever had a serious reaction after receiving a vaccination?   No   Do you have a long-term health problem with heart, lung, kidney, or metabolic disease (e.g., diabetes), asthma, a blood disorder, no spleen, complement component deficiency, a cochlear implant, or a spinal fluid leak?  Are you on long-term aspirin therapy?   Yes   Do you have cancer, leukemia, HIV/AIDS, or any other immune system problem?   No   Do you have a parent, brother, or sister with an immune system problem?   No   In the past 3 months, have you taken medications that affect  your immune system, such as prednisone, other steroids, or anticancer drugs; drugs for the treatment of rheumatoid arthritis, Crohn s disease, or psoriasis; or have you had radiation treatments?   No   Have you had a seizure, or a brain or other nervous system problem?   No   During the past year, have you received a transfusion of blood or blood    products, or been given immune (gamma) globulin or antiviral drug?   No   For women: Are you pregnant or is there a chance you could become       pregnant during the next month?   No   Have you received any vaccinations in the past 4 weeks?   No     Immunization questionnaire was positive for at least one answer.  Notified [place].      Patient instructed to remain in clinic for 15 minutes afterwards, and to report any adverse reactions.     Screening performed by Bela Ayon MA on 11/17/2023 at 3:17 PM.

## 2023-11-18 LAB
ALBUMIN SERPL BCG-MCNC: 4.6 G/DL (ref 3.5–5.2)
ALP SERPL-CCNC: 102 U/L (ref 40–150)
ALT SERPL W P-5'-P-CCNC: 47 U/L (ref 0–70)
ANION GAP SERPL CALCULATED.3IONS-SCNC: 12 MMOL/L (ref 7–15)
AST SERPL W P-5'-P-CCNC: 70 U/L (ref 0–45)
BILIRUB SERPL-MCNC: 0.4 MG/DL
BUN SERPL-MCNC: 15.1 MG/DL (ref 6–20)
CALCIUM SERPL-MCNC: 9.5 MG/DL (ref 8.6–10)
CHLORIDE SERPL-SCNC: 101 MMOL/L (ref 98–107)
CHOLEST SERPL-MCNC: 166 MG/DL
CREAT SERPL-MCNC: 0.84 MG/DL (ref 0.67–1.17)
DEPRECATED HCO3 PLAS-SCNC: 25 MMOL/L (ref 22–29)
EGFRCR SERPLBLD CKD-EPI 2021: >90 ML/MIN/1.73M2
GLUCOSE SERPL-MCNC: 78 MG/DL (ref 70–99)
HDLC SERPL-MCNC: 33 MG/DL
LDLC SERPL CALC-MCNC: 115 MG/DL
NONHDLC SERPL-MCNC: 133 MG/DL
POTASSIUM SERPL-SCNC: 4.1 MMOL/L (ref 3.4–5.3)
PROT SERPL-MCNC: 7.3 G/DL (ref 6.4–8.3)
PSA SERPL DL<=0.01 NG/ML-MCNC: 0.71 NG/ML (ref 0–3.5)
SODIUM SERPL-SCNC: 138 MMOL/L (ref 135–145)
TRIGL SERPL-MCNC: 92 MG/DL
TSH SERPL DL<=0.005 MIU/L-ACNC: 3.96 UIU/ML (ref 0.3–4.2)

## 2023-11-20 ENCOUNTER — TELEPHONE (OUTPATIENT)
Dept: FAMILY MEDICINE | Facility: CLINIC | Age: 48
End: 2023-11-20
Payer: COMMERCIAL

## 2023-11-20 ENCOUNTER — MYC MEDICAL ADVICE (OUTPATIENT)
Dept: FAMILY MEDICINE | Facility: CLINIC | Age: 48
End: 2023-11-20
Payer: COMMERCIAL

## 2023-11-20 NOTE — TELEPHONE ENCOUNTER
"----- Message from Nandini Angel PA-C sent at 11/20/2023 11:09 AM CST -----  Please notify patient:  Total cholesterol is 166, please continue exercise and watch diet.  Triglycerides are normal at 92, this is simple sugar and fat in blood. HDL which is the \"good\" cholesterol (heart protective)  is low at 33. Increase this with more exercise.  The LDL or \"bad\" cholesterol is at 115 but does not require medication at this time.   Your CBC shows no evidence of infection or anemia.  Your CMP reveals normal kidney function and electrolytes. Your glucose was normal. One of your liver levels (AST) was mildly elevated, I do recommend a recheck in 1-2 months on this.  Your thyroid test was normal as well.  Your PSA was normal.  Your diabetes lab, the A1C was normal at 5.6.    Please schedule patient for lab recheck in 1 month. Orders are placed.    "

## 2023-11-20 NOTE — TELEPHONE ENCOUNTER
Called patient and relayed information/instructions from provider. Patient has no further questions at this time and will follow up as needed/instructed.    Tru Abarca RN     [facility]

## 2023-12-22 ENCOUNTER — LAB (OUTPATIENT)
Dept: LAB | Facility: CLINIC | Age: 48
End: 2023-12-22
Payer: COMMERCIAL

## 2023-12-22 ENCOUNTER — TRANSFERRED RECORDS (OUTPATIENT)
Dept: HEALTH INFORMATION MANAGEMENT | Facility: CLINIC | Age: 48
End: 2023-12-22

## 2023-12-22 DIAGNOSIS — R74.01 ELEVATED AST (SGOT): ICD-10-CM

## 2023-12-22 PROCEDURE — 36415 COLL VENOUS BLD VENIPUNCTURE: CPT

## 2023-12-22 PROCEDURE — 80076 HEPATIC FUNCTION PANEL: CPT

## 2023-12-23 LAB
ALBUMIN SERPL BCG-MCNC: 4.6 G/DL (ref 3.5–5.2)
ALP SERPL-CCNC: 122 U/L (ref 40–150)
ALT SERPL W P-5'-P-CCNC: 19 U/L (ref 0–70)
AST SERPL W P-5'-P-CCNC: 21 U/L (ref 0–45)
BILIRUB DIRECT SERPL-MCNC: <0.2 MG/DL (ref 0–0.3)
BILIRUB SERPL-MCNC: 0.3 MG/DL
PROT SERPL-MCNC: 7.6 G/DL (ref 6.4–8.3)

## 2024-02-08 ENCOUNTER — DOCUMENTATION ONLY (OUTPATIENT)
Dept: FAMILY MEDICINE | Facility: CLINIC | Age: 49
End: 2024-02-08
Payer: COMMERCIAL

## 2024-02-08 NOTE — PROGRESS NOTES
Christian Judd has an upcoming lab appointment:    Future Appointments   Date Time Provider Department Center   2/16/2024  3:45 PM TS LAB [facility]     Patient is scheduled for the following lab(s): Follow up labwork per Nandini Angel PA-C:    Your CMP reveals normal kidney function and electrolytes. Your glucose was normal. One of your liver levels (AST) was mildly elevated, I do recommend a recheck in 1-2 months on this.     There is no order available. Please review and place either future orders or HMPO (Review of Health Maintenance Protocol Orders), as appropriate.    Health Maintenance Due   Topic    ANNUAL REVIEW OF HM ORDERS      Mulu Davies

## 2024-03-19 ENCOUNTER — MYC REFILL (OUTPATIENT)
Dept: FAMILY MEDICINE | Facility: CLINIC | Age: 49
End: 2024-03-19
Payer: COMMERCIAL

## 2024-03-19 DIAGNOSIS — E78.5 HYPERLIPIDEMIA, UNSPECIFIED HYPERLIPIDEMIA TYPE: ICD-10-CM

## 2024-03-19 DIAGNOSIS — J45.40 MODERATE PERSISTENT ASTHMA WITHOUT COMPLICATION: ICD-10-CM

## 2024-03-19 DIAGNOSIS — K21.9 GASTROESOPHAGEAL REFLUX DISEASE WITHOUT ESOPHAGITIS: ICD-10-CM

## 2024-03-20 RX ORDER — ATORVASTATIN CALCIUM 20 MG/1
20 TABLET, FILM COATED ORAL DAILY
Qty: 90 TABLET | Refills: 2 | Status: SHIPPED | OUTPATIENT
Start: 2024-03-20 | End: 2024-03-21

## 2024-03-20 RX ORDER — OMEPRAZOLE 40 MG/1
40 CAPSULE, DELAYED RELEASE ORAL DAILY
Qty: 90 CAPSULE | Refills: 2 | Status: SHIPPED | OUTPATIENT
Start: 2024-03-20 | End: 2024-03-21

## 2024-03-20 RX ORDER — ALBUTEROL SULFATE 90 UG/1
2 AEROSOL, METERED RESPIRATORY (INHALATION) EVERY 6 HOURS PRN
Qty: 18 G | Refills: 2 | Status: SHIPPED | OUTPATIENT
Start: 2024-03-20 | End: 2024-03-21

## 2024-09-06 ENCOUNTER — TELEPHONE (OUTPATIENT)
Dept: FAMILY MEDICINE | Facility: CLINIC | Age: 49
End: 2024-09-06
Payer: COMMERCIAL

## 2024-09-06 DIAGNOSIS — J45.40 MODERATE PERSISTENT ASTHMA WITHOUT COMPLICATION: Primary | ICD-10-CM

## 2024-09-06 NOTE — TELEPHONE ENCOUNTER
Medication Question or Refill    Contacts       Contact Date/Time Type Contact Phone/Fax    09/06/2024 12:02 PM CDT Fax (Incoming) [facility] [place] - [facility] AT Portal to Registered [facility] (Pharmacy) [phone]            What medication are you calling about (include dose and sig)?:   albuterol (PROAIR HFA/PROVENTIL HFA/VENTOLIN HFA) 108 (90 Base) MCG/ACT inhaler     Preferred Pharmacy:       [facility] - [place] [facility] AT Portal to Registered [facility]  [place] [address]  Phone: [phone] Fax: [phone]      Controlled Substance Agreement on file:   CSA -- Patient Level:    CSA: None found at the patient level.       Who prescribed the medication?: Nandini Angel        Do you have any questions or concerns?  Yes: Incoming fax from pharmacy:  Plan does not cover the prescribed medication. Please consider formulary generic - Albuterol PV or Levalbuterol -  as an alternative

## 2024-09-09 NOTE — TELEPHONE ENCOUNTER
Rx was written 11/2023 for albuterol HFA (Pro Air listed as well).     Notify pharmacy.    Nandini Angel PA-C

## 2024-09-10 RX ORDER — LEVALBUTEROL TARTRATE 45 UG/1
2 AEROSOL, METERED ORAL EVERY 4 HOURS PRN
Qty: 18 G | Refills: 2 | Status: SHIPPED | OUTPATIENT
Start: 2024-09-10

## 2024-09-10 NOTE — TELEPHONE ENCOUNTER
Called [facility] pharmacy and clarified request. They need a new order for a levalbuterol inhaler. Spoke with Nandini and got verbal from her to order alternative inhaler.

## 2024-10-18 ENCOUNTER — PATIENT OUTREACH (OUTPATIENT)
Dept: CARE COORDINATION | Facility: CLINIC | Age: 49
End: 2024-10-18
Payer: COMMERCIAL

## 2024-11-01 ENCOUNTER — PATIENT OUTREACH (OUTPATIENT)
Dept: CARE COORDINATION | Facility: CLINIC | Age: 49
End: 2024-11-01
Payer: COMMERCIAL

## 2024-12-19 ENCOUNTER — DOCUMENTATION ONLY (OUTPATIENT)
Dept: FAMILY MEDICINE | Facility: CLINIC | Age: 49
End: 2024-12-19
Payer: COMMERCIAL

## 2024-12-29 ENCOUNTER — HEALTH MAINTENANCE LETTER (OUTPATIENT)
Age: 49
End: 2024-12-29

## 2025-01-29 SDOH — HEALTH STABILITY: PHYSICAL HEALTH: ON AVERAGE, HOW MANY MINUTES DO YOU ENGAGE IN EXERCISE AT THIS LEVEL?: PATIENT DECLINED

## 2025-01-29 SDOH — HEALTH STABILITY: PHYSICAL HEALTH: ON AVERAGE, HOW MANY DAYS PER WEEK DO YOU ENGAGE IN MODERATE TO STRENUOUS EXERCISE (LIKE A BRISK WALK)?: 5 DAYS

## 2025-01-29 ASSESSMENT — ASTHMA QUESTIONNAIRES
QUESTION_3 LAST FOUR WEEKS HOW OFTEN DID YOUR ASTHMA SYMPTOMS (WHEEZING, COUGHING, SHORTNESS OF BREATH, CHEST TIGHTNESS OR PAIN) WAKE YOU UP AT NIGHT OR EARLIER THAN USUAL IN THE MORNING: NOT AT ALL
QUESTION_1 LAST FOUR WEEKS HOW MUCH OF THE TIME DID YOUR ASTHMA KEEP YOU FROM GETTING AS MUCH DONE AT WORK, SCHOOL OR AT HOME: NONE OF THE TIME
QUESTION_4 LAST FOUR WEEKS HOW OFTEN HAVE YOU USED YOUR RESCUE INHALER OR NEBULIZER MEDICATION (SUCH AS ALBUTEROL): ONCE A WEEK OR LESS
QUESTION_5 LAST FOUR WEEKS HOW WOULD YOU RATE YOUR ASTHMA CONTROL: COMPLETELY CONTROLLED
ACT_TOTALSCORE: 23
ACT_TOTALSCORE: 23
QUESTION_2 LAST FOUR WEEKS HOW OFTEN HAVE YOU HAD SHORTNESS OF BREATH: ONCE OR TWICE A WEEK

## 2025-01-29 ASSESSMENT — SOCIAL DETERMINANTS OF HEALTH (SDOH): HOW OFTEN DO YOU GET TOGETHER WITH FRIENDS OR RELATIVES?: ONCE A WEEK

## 2025-02-03 ENCOUNTER — OFFICE VISIT (OUTPATIENT)
Dept: FAMILY MEDICINE | Facility: CLINIC | Age: 50
End: 2025-02-03
Payer: COMMERCIAL

## 2025-02-03 VITALS
BODY MASS INDEX: 32.05 KG/M2 | WEIGHT: 216.4 LBS | HEIGHT: 69 IN | HEART RATE: 76 BPM | SYSTOLIC BLOOD PRESSURE: 117 MMHG | OXYGEN SATURATION: 96 % | DIASTOLIC BLOOD PRESSURE: 79 MMHG | TEMPERATURE: 97.9 F | RESPIRATION RATE: 18 BRPM

## 2025-02-03 DIAGNOSIS — E66.9 OBESITY WITH SERIOUS COMORBIDITY, UNSPECIFIED CLASS, UNSPECIFIED OBESITY TYPE: ICD-10-CM

## 2025-02-03 DIAGNOSIS — Z12.5 SCREENING FOR PROSTATE CANCER: ICD-10-CM

## 2025-02-03 DIAGNOSIS — K21.9 GASTROESOPHAGEAL REFLUX DISEASE WITHOUT ESOPHAGITIS: ICD-10-CM

## 2025-02-03 DIAGNOSIS — Z00.00 ROUTINE GENERAL MEDICAL EXAMINATION AT A HEALTH CARE FACILITY: Primary | ICD-10-CM

## 2025-02-03 DIAGNOSIS — E78.5 HYPERLIPIDEMIA, UNSPECIFIED HYPERLIPIDEMIA TYPE: ICD-10-CM

## 2025-02-03 DIAGNOSIS — J45.40 MODERATE PERSISTENT ASTHMA WITHOUT COMPLICATION: ICD-10-CM

## 2025-02-03 LAB
ERYTHROCYTE [DISTWIDTH] IN BLOOD BY AUTOMATED COUNT: 12.6 % (ref 10–15)
EST. AVERAGE GLUCOSE BLD GHB EST-MCNC: 117 MG/DL
HBA1C MFR BLD: 5.7 % (ref 0–5.6)
HCT VFR BLD AUTO: 43.3 % (ref 40–53)
HGB BLD-MCNC: 14.2 G/DL (ref 13.3–17.7)
MCH RBC QN AUTO: 26.9 PG (ref 26.5–33)
MCHC RBC AUTO-ENTMCNC: 32.8 G/DL (ref 31.5–36.5)
MCV RBC AUTO: 82 FL (ref 78–100)
PLATELET # BLD AUTO: 283 10E3/UL (ref 150–450)
RBC # BLD AUTO: 5.28 10E6/UL (ref 4.4–5.9)
WBC # BLD AUTO: 9.1 10E3/UL (ref 4–11)

## 2025-02-03 PROCEDURE — 85027 COMPLETE CBC AUTOMATED: CPT | Performed by: PHYSICIAN ASSISTANT

## 2025-02-03 PROCEDURE — 84443 ASSAY THYROID STIM HORMONE: CPT | Performed by: PHYSICIAN ASSISTANT

## 2025-02-03 PROCEDURE — 99214 OFFICE O/P EST MOD 30 MIN: CPT | Mod: 25 | Performed by: PHYSICIAN ASSISTANT

## 2025-02-03 PROCEDURE — 84439 ASSAY OF FREE THYROXINE: CPT | Performed by: PHYSICIAN ASSISTANT

## 2025-02-03 PROCEDURE — 36415 COLL VENOUS BLD VENIPUNCTURE: CPT | Performed by: PHYSICIAN ASSISTANT

## 2025-02-03 PROCEDURE — 80061 LIPID PANEL: CPT | Performed by: PHYSICIAN ASSISTANT

## 2025-02-03 PROCEDURE — G2211 COMPLEX E/M VISIT ADD ON: HCPCS | Performed by: PHYSICIAN ASSISTANT

## 2025-02-03 PROCEDURE — 80053 COMPREHEN METABOLIC PANEL: CPT | Performed by: PHYSICIAN ASSISTANT

## 2025-02-03 PROCEDURE — G0103 PSA SCREENING: HCPCS | Performed by: PHYSICIAN ASSISTANT

## 2025-02-03 PROCEDURE — 99396 PREV VISIT EST AGE 40-64: CPT | Performed by: PHYSICIAN ASSISTANT

## 2025-02-03 PROCEDURE — 83036 HEMOGLOBIN GLYCOSYLATED A1C: CPT | Performed by: PHYSICIAN ASSISTANT

## 2025-02-03 RX ORDER — LEVALBUTEROL TARTRATE 45 UG/1
2 AEROSOL, METERED ORAL EVERY 4 HOURS PRN
Qty: 18 G | Refills: 2 | Status: SHIPPED | OUTPATIENT
Start: 2025-02-03

## 2025-02-03 RX ORDER — FLUTICASONE PROPIONATE 220 UG/1
1 AEROSOL, METERED RESPIRATORY (INHALATION) 2 TIMES DAILY
Qty: 12 G | Refills: 3 | Status: SHIPPED | OUTPATIENT
Start: 2025-02-03

## 2025-02-03 RX ORDER — ATORVASTATIN CALCIUM 20 MG/1
20 TABLET, FILM COATED ORAL DAILY
Qty: 90 TABLET | Refills: 3 | Status: SHIPPED | OUTPATIENT
Start: 2025-02-03

## 2025-02-03 NOTE — LETTER
My Asthma Action Plan    Name: Christian Judd   YOB: 1975  Date: 2/3/2025   My doctor: Nandini Angel PA-C   My clinic: [facility]        My Rescue Medicine:   Albuterol inhaler (Proair/Ventolin/Proventil HFA)  2-4 puffs EVERY 4 HOURS as needed. Use a spacer if recommended by your provider.   My Asthma Severity:   Intermittent / Exercise Induced  Know your asthma triggers: upper respiratory infections             GREEN ZONE   Good Control  I feel good  No cough or wheeze  Can work, sleep and play without asthma symptoms       Take your asthma control medicine every day.     If exercise triggers your asthma, take your rescue medication  15 minutes before exercise or sports, and  During exercise if you have asthma symptoms  Spacer to use with inhaler: If you have a spacer, make sure to use it with your inhaler             YELLOW ZONE Getting Worse  I have ANY of these:  I do not feel good  Cough or wheeze  Chest feels tight  Wake up at night   Keep taking your Green Zone medications  Start taking your rescue medicine:  every 20 minutes for up to 1 hour. Then every 4 hours for 24-48 hours.  If you stay in the Yellow Zone for more than 12-24 hours, contact your doctor.  If you do not return to the Green Zone in 12-24 hours or you get worse, start taking your oral steroid medicine if prescribed by your provider.           RED ZONE Medical Alert - Get Help  I have ANY of these:  I feel awful  Medicine is not helping  Breathing getting harder  Trouble walking or talking  Nose opens wide to breathe       Take your rescue medicine NOW  If your provider has prescribed an oral steroid medicine, start taking it NOW  Call your doctor NOW  If you are still in the Red Zone after 20 minutes and you have not reached your doctor:  Take your rescue medicine again and  Call 911 or go to the emergency room right away    See your regular doctor within 2 weeks of an Emergency Room or  Urgent Care visit for follow-up treatment.          Annual Reminders:  Meet with Asthma Educator,  Flu Shot in the Fall, consider Pneumonia Vaccination for patients with asthma (aged 19 and older).    [facility] [place] [facility] AT PORTAL TO REGISTERED [facility]    Electronically signed by Nandini Angel PA-C   Date: 02/03/25                    Asthma Triggers  How To Control Things That Make Your Asthma Worse    Triggers are things that make your asthma worse.  Look at the list below to help you find your triggers and   what you can do about them. You can help prevent asthma flare-ups by staying away from your triggers.      Trigger                                                          What you can do   Cigarette Smoke  Tobacco smoke can make asthma worse. Do not allow smoking in your home, car or around you.  Be sure no one smokes at a child s day care or school.  If you smoke, ask your health care provider for ways to help you quit.  Ask family members to quit too.  Ask your health care provider for a referral to Quit Plan to help you quit smoking, or call 2-110-225-PLAN.     Colds, Flu, Bronchitis  These are common triggers of asthma. Wash your hands often.  Don t touch your eyes, nose or mouth.  Get a flu shot every year.     Dust Mites  These are tiny bugs that live in cloth or carpet. They are too small to see. Wash sheets and blankets in hot water every week.   Encase pillows and mattress in dust mite proof covers.  Avoid having carpet if you can. If you have carpet, vacuum weekly.   Use a dust mask and HEPA vacuum.   Pollen and Outdoor Mold  Some people are allergic to trees, grass, or weed pollen, or molds. Try to keep your windows closed.  Limit time out doors when pollen count is high.   Ask you health care provider about taking medicine during allergy season.     Animal Dander  Some people are allergic to skin flakes, urine or  saliva from pets with fur or feathers. Keep pets with fur or feathers out of your home.    If you can t keep the pet outdoors, then keep the pet out of your bedroom.  Keep the bedroom door closed.  Keep pets off cloth furniture and away from stuffed toys.     Mice, Rats, and Cockroaches  Some people are allergic to the waste from these pests.   Cover food and garbage.  Clean up spills and food crumbs.  Store grease in the refrigerator.   Keep food out of the bedroom.   Indoor Mold  This can be a trigger if your home has high moisture. Fix leaking faucets, pipes, or other sources of water.   Clean moldy surfaces.  Dehumidify basement if it is damp and smelly.   Smoke, Strong Odors, and Sprays  These can reduce air quality. Stay away from strong odors and sprays, such as perfume, powder, hair spray, paints, smoke incense, paint, cleaning products, candles and new carpet.   Exercise or Sports  Some people with asthma have this trigger. Be active!  Ask your doctor about taking medicine before sports or exercise to prevent symptoms.    Warm up for 5-10 minutes before and after sports or exercise.     Other Triggers of Asthma  Cold air:  Cover your nose and mouth with a scarf.  Sometimes laughing or crying can be a trigger.  Some medicines and food can trigger asthma.

## 2025-02-03 NOTE — PATIENT INSTRUCTIONS
Patient Education   Preventive Care Advice   This is general advice given by our system to help you stay healthy. However, your care team may have specific advice just for you. Please talk to your care team about your preventive care needs.  Nutrition  Eat 5 or more servings of fruits and vegetables each day.  Try wheat bread, brown rice and whole grain pasta (instead of white bread, rice, and pasta).  Get enough calcium and vitamin D. Check the label on foods and aim for 100% of the RDA (recommended daily allowance).  Lifestyle  Exercise at least 150 minutes each week  (30 minutes a day, 5 days a week).  Do muscle strengthening activities 2 days a week. These help control your weight and prevent disease.  No smoking.  Wear sunscreen to prevent skin cancer.  Have a dental exam and cleaning every 6 months.  Yearly exams  See your health care team every year to talk about:  Any changes in your health.  Any medicines your care team has prescribed.  Preventive care, family planning, and ways to prevent chronic diseases.  Shots (vaccines)   HPV shots (up to age 26), if you've never had them before.  Hepatitis B shots (up to age 59), if you've never had them before.  COVID-19 shot: Get this shot when it's due.  Flu shot: Get a flu shot every year.  Tetanus shot: Get a tetanus shot every 10 years.  Pneumococcal, hepatitis A, and RSV shots: Ask your care team if you need these based on your risk.  Shingles shot (for age 50 and up)  General health tests  Diabetes screening:  Starting at age 35, Get screened for diabetes at least every 3 years.  If you are younger than age 35, ask your care team if you should be screened for diabetes.  Cholesterol test: At age 39, start having a cholesterol test every 5 years, or more often if advised.  Bone density scan (DEXA): At age 50, ask your care team if you should have this scan for osteoporosis (brittle bones).  Hepatitis C: Get tested at least once in your life.  STIs (sexually  transmitted infections)  Before age 24: Ask your care team if you should be screened for STIs.  After age 24: Get screened for STIs if you're at risk. You are at risk for STIs (including HIV) if:  You are sexually active with more than one person.  You don't use condoms every time.  You or a partner was diagnosed with a sexually transmitted infection.  If you are at risk for HIV, ask about PrEP medicine to prevent HIV.  Get tested for HIV at least once in your life, whether you are at risk for HIV or not.  Cancer screening tests  Cervical cancer screening: If you have a cervix, begin getting regular cervical cancer screening tests starting at age 21.  Breast cancer scan (mammogram): If you've ever had breasts, begin having regular mammograms starting at age 40. This is a scan to check for breast cancer.  Colon cancer screening: It is important to start screening for colon cancer at age 45.  Have a colonoscopy test every 10 years (or more often if you're at risk) Or, ask your provider about stool tests like a FIT test every year or Cologuard test every 3 years.  To learn more about your testing options, visit:   .  For help making a decision, visit:   https://bit.ly/ao47859.  Prostate cancer screening test: If you have a prostate, ask your care team if a prostate cancer screening test (PSA) at age 55 is right for you.  Lung cancer screening: If you are a current or former smoker ages 50 to 80, ask your care team if ongoing lung cancer screenings are right for you.  For informational purposes only. Not to replace the advice of your health care provider. Copyright   2023 [facility]. All rights reserved. Clinically reviewed by the [facility]. [employer] 668205 - REV 01/24.  Preventing Falls: Care Instructions  Injuries and health problems such as trouble walking or poor eyesight can increase your risk of falling. So can some medicines. But there are things you can do to help  "prevent falls. You can exercise to get stronger. You can also arrange your home to make it safer.    Talk to your doctor about the medicines you take. Ask if any of them increase the risk of falls and whether they can be changed or stopped.   Try to exercise regularly. It can help improve your strength and balance. This can help lower your risk of falling.         Practice fall safety and prevention.   Wear low-heeled shoes that fit well and give your feet good support. Talk to your doctor if you have foot problems that make this hard.  Carry a cellphone or wear a medical alert device that you can use to call for help.  Use stepladders instead of chairs to reach high objects. Don't climb if you're at risk for falls. Ask for help, if needed.  Wear the correct eyeglasses, if you need them.        Make your home safer.   Remove rugs, cords, clutter, and furniture from walkways.  Keep your house well lit. Use night-lights in hallways and bathrooms.  Install and use sturdy handrails on stairways.  Wear nonskid footwear, even inside. Don't walk barefoot or in socks without shoes.        Be safe outside.   Use handrails, curb cuts, and ramps whenever possible.  Keep your hands free by using a shoulder bag or backpack.  Try to walk in well-lit areas. Watch out for uneven ground, changes in pavement, and debris.  Be careful in the winter. Walk on the grass or gravel when sidewalks are slippery. Use de-icer on steps and walkways. Add non-slip devices to shoes.    Put grab bars and nonskid mats in your shower or tub and near the toilet. Try to use a shower chair or bath bench when bathing.   Get into a tub or shower by putting in your weaker leg first. Get out with your strong side first. Have a phone or medical alert device in the bathroom with you.   Where can you learn more?  Go to https://www.[employer].net/patiented  Enter G117 in the search box to learn more about \"Preventing Falls: Care Instructions.\"  Current as of: " July 31, 2024  Content Version: 14.3    2024 [employer].   Care instructions adapted under license by your healthcare professional. If you have questions about a medical condition or this instruction, always ask your healthcare professional. [employer] disclaims any warranty or liability for your use of this information.

## 2025-02-03 NOTE — PROGRESS NOTES
"Preventive Care Visit  [facility]  Nandini Angel PA-C, Physician Assistant - Medical  Feb 3, 2025      Assessment & Plan     Routine general medical examination at a health care facility  Labs updated today.  Colonoscopy- up to date.  Vaccines- declines all.  Prostate cancer- PSA updated today.     Screening for prostate cancer  PSA updated today.     Hyperlipidemia, unspecified hyperlipidemia type  Chronic issue, recheck labs. Continue Lipitor.     Gastroesophageal reflux disease without esophagitis  Chronic issue, stable, continue Prilosec.    Moderate persistent asthma without complication  Chronic issue, no recent hospitalizations. AAP updated.   Flovent and Xopenex refilled.     Obesity with serious comorbidity, unspecified class, unspecified obesity type  Chronic issue, work on lifestyle changes.       Patient has been advised of split billing requirements and indicates understanding: Yes        BMI  Estimated body mass index is 31.96 kg/m  as calculated from the following:    Height as of this encounter: 1.753 m (5' 9\").    Weight as of this encounter: 98.2 kg (216 lb 6.4 oz).   Weight management plan: Discussed healthy diet and exercise guidelines    Counseling  Appropriate preventive services were addressed with this patient via screening, questionnaire, or discussion as appropriate for fall prevention, nutrition, physical activity, Tobacco-use cessation, social engagement, weight loss and cognition.  Checklist reviewing preventive services available has been given to the patient.  Reviewed patient's diet, addressing concerns and/or questions.   The patient was instructed to see the dentist every 6 months.       Risks, benefits and alternatives were discussed with patient. Agreeable to the plan of care.    The longitudinal plan of care for the diagnosis(es)/condition(s) as documented were addressed during this visit. Due to the added complexity in care, I will " continue to support the patient in the subsequent management and ongoing continuity of care.    Gabe Gonzales is a 49 year old, presenting for the following:  Physical        2/3/2025     4:10 PM   Additional Questions   Roomed by YOSEF Ayon CMA([facility])          HPI      Health Care Directive  Patient does not have a Health Care Directive: Discussed advance care planning with patient; however, patient declined at this time.      1/29/2025   General Health   How would you rate your overall physical health? (!) FAIR   Feel stress (tense, anxious, or unable to sleep) Not at all         1/29/2025   Nutrition   Three or more servings of calcium each day? (!) NO   Diet: Regular (no restrictions)   How many servings of fruit and vegetables per day? (!) 0-1   How many sweetened beverages each day? 0-1         1/29/2025   Exercise   Days per week of moderate/strenous exercise 5 days   Average minutes spent exercising at this level Patient declined         1/29/2025   Social Factors   Frequency of gathering with friends or relatives Once a week   Worry food won't last until get money to buy more No   Food not last or not have enough money for food? No   Do you have housing? (Housing is defined as stable permanent housing and does not include staying ouside in a car, in a tent, in an abandoned building, in an overnight shelter, or couch-surfing.) Yes   Are you worried about losing your housing? No   Lack of transportation? No   Unable to get utilities (heat,electricity)? No         2/3/2025   Fall Risk   Gait Speed Test (Document in seconds) 3.52   Gait Speed Test Interpretation Less than or equal to 5.00 seconds - PASS          1/29/2025   Dental   Dentist two times every year? (!) NO         1/29/2025   TB Screening   Were you born outside of the [place]? No         Today's PHQ-2 Score:       2/3/2025     3:52 PM   PHQ-2 ( 1999 [employer])   Q1: Little interest or pleasure in doing things 0   Q2: Feeling down, depressed or hopeless  0   PHQ-2 Score 0    Q1: Little interest or pleasure in doing things Not at all   Q2: Feeling down, depressed or hopeless Not at all   PHQ-2 Score 0       Patient-reported           2025   Substance Use   Alcohol more than 3/day or more than 7/wk No   Do you use any other substances recreationally? No     Social History     Tobacco Use    Smoking status: Former     Current packs/day: 0.00     Average packs/day: 1 pack/day for 15.0 years (15.0 ttl pk-yrs)     Types: Cigarettes     Quit date: 2020     Years since quittin.3    Smokeless tobacco: Never   Substance Use Topics    Alcohol use: Not Currently    Drug use: Never           2025   STI Screening   New sexual partner(s) since last STI/HIV test? No   ASCVD Risk   The 10-year ASCVD risk score (Yamilex DANIELLE, et al., 2019) is: 3.9%    Values used to calculate the score:      Age: 49 years      Sex: Male      Is Non- : No      Diabetic: No      Tobacco smoker: No      Systolic Blood Pressure: 117 mmHg      Is BP treated: No      HDL Cholesterol: 33 mg/dL      Total Cholesterol: 166 mg/dL           Reviewed and updated as needed this visit by Provider                    Patient Active Problem List   Diagnosis    CARDIOVASCULAR SCREENING; LDL GOAL LESS THAN 160    Esophageal reflux    History of tobacco use    Hyperlipidemia    Moderate persistent asthma without complication    Class 1 obesity in adult, unspecified BMI, unspecified obesity type, unspecified whether serious comorbidity present     Past Surgical History:   Procedure Laterality Date    COLONOSCOPY      ORTHOPEDIC SURGERY  Not sure    SOFT TISSUE SURGERY  Not sure       Social History     Tobacco Use    Smoking status: Former     Current packs/day: 0.00     Average packs/day: 1 pack/day for 15.0 years (15.0 ttl pk-yrs)     Types: Cigarettes     Quit date: 2020     Years since quittin.3    Smokeless tobacco: Never   Substance Use Topics    Alcohol  "use: Not Currently     Family History   Problem Relation Age of Onset    Hyperlipidemia Father     Diabetes Father     Coronary Artery Disease Father     Diabetes Maternal Grandmother          Current Outpatient Medications   Medication Sig Dispense Refill    atorvastatin (LIPITOR) 20 MG tablet Take 1 tablet (20 mg) by mouth daily. 90 tablet 0    fluticasone (FLOVENT HFA) 220 MCG/ACT inhaler Inhale 1 puff into the lungs 2 times daily 12 g 3    levalbuterol (XOPENEX HFA) 45 MCG/ACT inhaler Inhale 2 puffs into the lungs every 4 hours as needed for shortness of breath or wheezing. 18 g 2    omeprazole (PRILOSEC) 40 MG DR capsule Take 1 capsule (40 mg) by mouth daily. 90 capsule 2     Allergies   Allergen Reactions    Nkda [No Known Drug Allergy]          Review of Systems  Constitutional, HEENT, cardiovascular, pulmonary, gi and gu systems are negative, except as otherwise noted.     Objective    Exam  /79   Pulse 76   Temp 97.9  F (36.6  C) (Oral)   Resp 18   Ht 1.753 m (5' 9\")   Wt 98.2 kg (216 lb 6.4 oz)   SpO2 96%   BMI 31.96 kg/m     Estimated body mass index is 31.96 kg/m  as calculated from the following:    Height as of this encounter: 1.753 m (5' 9\").    Weight as of this encounter: 98.2 kg (216 lb 6.4 oz).    Physical Exam  GENERAL: alert and no distress  EYES: Eyes grossly normal to inspection, PERRL and conjunctivae and sclerae normal  HENT: ear canals and TM's normal, nose and mouth without ulcers or lesions  NECK: no adenopathy, no asymmetry, masses, or scars  RESP: lungs clear to auscultation - no rales, rhonchi or wheezes  CV: regular rate and rhythm, normal S1 S2, no S3 or S4, no murmur, click or rub, no peripheral edema  ABDOMEN: soft, nontender, no hepatosplenomegaly, no masses and bowel sounds normal  MS: no gross musculoskeletal defects noted, no edema  SKIN: no suspicious lesions or rashes  NEURO: Normal strength and tone, mentation intact and speech normal  PSYCH: mentation appears " normal, affect normal/bright        Signed Electronically by: Nandini Angel PA-C

## 2025-02-04 LAB
ALBUMIN SERPL BCG-MCNC: 4.4 G/DL (ref 3.5–5.2)
ALP SERPL-CCNC: 81 U/L (ref 40–150)
ALT SERPL W P-5'-P-CCNC: 13 U/L (ref 0–70)
ANION GAP SERPL CALCULATED.3IONS-SCNC: 13 MMOL/L (ref 7–15)
AST SERPL W P-5'-P-CCNC: 22 U/L (ref 0–45)
BILIRUB SERPL-MCNC: 0.3 MG/DL
BUN SERPL-MCNC: 18.9 MG/DL (ref 6–20)
CALCIUM SERPL-MCNC: 9.3 MG/DL (ref 8.8–10.4)
CHLORIDE SERPL-SCNC: 103 MMOL/L (ref 98–107)
CHOLEST SERPL-MCNC: 171 MG/DL
CREAT SERPL-MCNC: 0.89 MG/DL (ref 0.67–1.17)
EGFRCR SERPLBLD CKD-EPI 2021: >90 ML/MIN/1.73M2
FASTING STATUS PATIENT QL REPORTED: NO
FASTING STATUS PATIENT QL REPORTED: NO
GLUCOSE SERPL-MCNC: 80 MG/DL (ref 70–99)
HCO3 SERPL-SCNC: 23 MMOL/L (ref 22–29)
HDLC SERPL-MCNC: 35 MG/DL
LDLC SERPL CALC-MCNC: 108 MG/DL
NONHDLC SERPL-MCNC: 136 MG/DL
POTASSIUM SERPL-SCNC: 4 MMOL/L (ref 3.4–5.3)
PROT SERPL-MCNC: 6.9 G/DL (ref 6.4–8.3)
PSA SERPL DL<=0.01 NG/ML-MCNC: 0.7 NG/ML (ref 0–3.5)
SODIUM SERPL-SCNC: 139 MMOL/L (ref 135–145)
T4 FREE SERPL-MCNC: 1.02 NG/DL (ref 0.9–1.7)
TRIGL SERPL-MCNC: 140 MG/DL
TSH SERPL DL<=0.005 MIU/L-ACNC: 6.13 UIU/ML (ref 0.3–4.2)

## 2025-08-28 DIAGNOSIS — K21.9 GASTROESOPHAGEAL REFLUX DISEASE WITHOUT ESOPHAGITIS: ICD-10-CM

## 2025-08-28 RX ORDER — OMEPRAZOLE 40 MG/1
40 CAPSULE, DELAYED RELEASE ORAL DAILY
Qty: 90 CAPSULE | Refills: 0 | Status: SHIPPED | OUTPATIENT
Start: 2025-08-28